# Patient Record
Sex: MALE | Race: WHITE | NOT HISPANIC OR LATINO | URBAN - METROPOLITAN AREA
[De-identification: names, ages, dates, MRNs, and addresses within clinical notes are randomized per-mention and may not be internally consistent; named-entity substitution may affect disease eponyms.]

---

## 2023-08-10 ENCOUNTER — HOSPITAL ENCOUNTER (OUTPATIENT)
Dept: DATA CONVERSION | Facility: HOSPITAL | Age: 68
Discharge: HOME | End: 2023-08-10

## 2023-08-10 DIAGNOSIS — M48.062 SPINAL STENOSIS, LUMBAR REGION WITH NEUROGENIC CLAUDICATION: ICD-10-CM

## 2023-08-10 DIAGNOSIS — M96.1 POSTLAMINECTOMY SYNDROME, NOT ELSEWHERE CLASSIFIED: ICD-10-CM

## 2023-09-22 PROBLEM — M96.1 LUMBAR POST-LAMINECTOMY SYNDROME: Status: ACTIVE | Noted: 2023-09-22

## 2023-09-22 PROBLEM — G47.33 OBSTRUCTIVE SLEEP APNEA SYNDROME: Status: ACTIVE | Noted: 2023-09-22

## 2023-09-22 PROBLEM — M51.369 LUMBAR DEGENERATIVE DISC DISEASE: Status: ACTIVE | Noted: 2023-09-22

## 2023-09-22 PROBLEM — M51.36 LUMBAR DEGENERATIVE DISC DISEASE: Status: ACTIVE | Noted: 2023-09-22

## 2023-09-22 RX ORDER — TADALAFIL 20 MG/1
0.5 TABLET ORAL AS NEEDED
COMMUNITY

## 2023-09-22 RX ORDER — OXYCODONE AND ACETAMINOPHEN 7.5; 325 MG/1; MG/1
TABLET ORAL
COMMUNITY
Start: 2023-08-28 | End: 2023-10-11 | Stop reason: ALTCHOICE

## 2023-09-22 RX ORDER — CITALOPRAM 10 MG/1
TABLET ORAL
COMMUNITY

## 2023-09-22 RX ORDER — OXYCODONE AND ACETAMINOPHEN 5; 325 MG/1; MG/1
TABLET ORAL
COMMUNITY
Start: 2022-12-12 | End: 2023-10-11 | Stop reason: ALTCHOICE

## 2023-09-22 RX ORDER — NITROGLYCERIN 0.4 MG/1
TABLET SUBLINGUAL
COMMUNITY

## 2023-09-22 RX ORDER — CETIRIZINE HYDROCHLORIDE 10 MG/1
TABLET ORAL
COMMUNITY
End: 2023-11-21

## 2023-09-22 RX ORDER — GABAPENTIN 300 MG/1
1 CAPSULE ORAL 3 TIMES DAILY
COMMUNITY
Start: 2022-07-28

## 2023-09-22 RX ORDER — TESTOSTERONE CYPIONATE 200 MG/ML
INJECTION, SOLUTION INTRAMUSCULAR
COMMUNITY

## 2023-09-22 RX ORDER — ALENDRONATE SODIUM 35 MG/1
TABLET ORAL
COMMUNITY

## 2023-10-10 ENCOUNTER — TELEPHONE (OUTPATIENT)
Dept: PAIN MEDICINE | Facility: CLINIC | Age: 68
End: 2023-10-10
Payer: MEDICARE

## 2023-10-10 DIAGNOSIS — M96.1 LUMBAR POST-LAMINECTOMY SYNDROME: Primary | ICD-10-CM

## 2023-10-10 NOTE — TELEPHONE ENCOUNTER
PT CALLED REQUESTING REFILL OF PERCOCET. LAST REFILL WAS 08/28/1923.  AWAITING SURGERY TO BE SCHEDULED

## 2023-10-10 NOTE — TELEPHONE ENCOUNTER
RECEIVED COAG CLEARANCE BACK FROM DR. CESPEDES WITH ADDITIONAL DIRECTION OF NEEDING TO CONTINUE ASA WHILE OFF BRILLIN[place]

## 2023-10-11 RX ORDER — OXYCODONE AND ACETAMINOPHEN 7.5; 325 MG/1; MG/1
1 TABLET ORAL EVERY 6 HOURS PRN
Qty: 120 TABLET | Refills: 0 | OUTPATIENT
Start: 2023-10-11 | End: 2024-02-27

## 2023-10-20 ENCOUNTER — TELEMEDICINE (OUTPATIENT)
Dept: PAIN MEDICINE | Facility: CLINIC | Age: 68
End: 2023-10-20
Payer: MEDICARE

## 2023-10-20 DIAGNOSIS — M96.1 POSTLAMINECTOMY SYNDROME, LUMBAR REGION: Primary | ICD-10-CM

## 2023-10-20 PROCEDURE — 99214 OFFICE O/P EST MOD 30 MIN: CPT | Mod: 95 | Performed by: ANESTHESIOLOGY

## 2023-10-20 PROCEDURE — 99214 OFFICE O/P EST MOD 30 MIN: CPT | Performed by: ANESTHESIOLOGY

## 2023-10-20 RX ORDER — CLOPIDOGREL BISULFATE 75 MG/1
75 TABLET ORAL DAILY
COMMUNITY

## 2023-10-20 ASSESSMENT — ENCOUNTER SYMPTOMS
ARTHRALGIAS: 1
BACK PAIN: 1
ACTIVITY CHANGE: 1
COUGH: 1

## 2023-10-20 ASSESSMENT — PAIN - FUNCTIONAL ASSESSMENT: PAIN_FUNCTIONAL_ASSESSMENT: 0-10

## 2023-10-20 ASSESSMENT — LIFESTYLE VARIABLES
HOW MANY STANDARD DRINKS CONTAINING ALCOHOL DO YOU HAVE ON A TYPICAL DAY: PATIENT DOES NOT DRINK
TOTAL SCORE: 0

## 2023-10-20 ASSESSMENT — PATIENT HEALTH QUESTIONNAIRE - PHQ9
SUM OF ALL RESPONSES TO PHQ9 QUESTIONS 1 & 2: 0
1. LITTLE INTEREST OR PLEASURE IN DOING THINGS: NOT AT ALL
2. FEELING DOWN, DEPRESSED OR HOPELESS: NOT AT ALL

## 2023-10-20 NOTE — PROGRESS NOTES
The patient is a 68-year-old male with low back and bilateral leg pain.  The pain is constant.  The pain is worse when he is standing and walking.  He gets some relief with rest.  The patient underwent spine surgery last year.  The surgery was quite successful in addressing his neurologic deficits and his instability however the patient is still experiencing significant residual pain.  He gets marginal relief with medications.  He did not benefit from physical therapy or a caudal epidural steroid injection.  The patient has questions about spinal cord stimulation.    Review of Systems   Constitutional:  Positive for activity change.   Respiratory:  Positive for cough.    Musculoskeletal:  Positive for arthralgias, back pain and gait problem.   All other systems reviewed and are negative.    GENERAL: alert and appropriate, in no distress, well-hydrated, well-nourished and interactive  SKIN: no rash noted, surgical scars well healed  RESPIRATORY: breathing non-labored and no grunting/flaring/retractions  CHEST: equal chest rise with normal respiratory effort  ABDOMEN: soft and non-tender  BACK: back normal in appearance, spine with reduced ROM  EXTREMITIES: strength intact  NEUROLOGIC: gait antalgic, SLR negative, sensation grossly intact.    Assessment and Plan    -Chronicity--chronic spinal pain    -Diagnostics--no new imaging ordered    -Pharmacologic--continue medication regimen    -Psychologic--no psychologic intervention    -Physical--we discussed the importance of physical therapy and exercise.  We discussed avoidance and modification techniques.    -Intervention--the patient is a candidate for spinal cord stimulator trial with a [employer] device.  I explained the risks benefits and alternatives of the procedure to the patient.  The patient wishes to proceed.    I spent time educating the patient on the condition including the treatment and the prognosis.  I invited the patient to call at anytime with  any questions.

## 2023-11-21 ENCOUNTER — HOSPITAL ENCOUNTER (OUTPATIENT)
Dept: OPERATING ROOM | Facility: HOSPITAL | Age: 68
Setting detail: OUTPATIENT SURGERY
Discharge: HOME | End: 2023-11-21
Payer: MEDICARE

## 2023-11-21 ENCOUNTER — HOSPITAL ENCOUNTER (OUTPATIENT)
Dept: RADIOLOGY | Facility: HOSPITAL | Age: 68
Discharge: HOME | End: 2023-11-21
Payer: MEDICARE

## 2023-11-21 VITALS
SYSTOLIC BLOOD PRESSURE: 122 MMHG | DIASTOLIC BLOOD PRESSURE: 74 MMHG | WEIGHT: 217 LBS | RESPIRATION RATE: 16 BRPM | HEART RATE: 75 BPM | TEMPERATURE: 98.4 F | HEIGHT: 68 IN | BODY MASS INDEX: 32.89 KG/M2 | OXYGEN SATURATION: 98 %

## 2023-11-21 DIAGNOSIS — M96.1 POSTLAMINECTOMY SYNDROME, LUMBAR REGION: ICD-10-CM

## 2023-11-21 DIAGNOSIS — M96.1 LUMBAR POST-LAMINECTOMY SYNDROME: Primary | ICD-10-CM

## 2023-11-21 PROCEDURE — 95972 ALYS CPLX SP/PN NPGT W/PRGRM: CPT | Mod: 59 | Performed by: ANESTHESIOLOGY

## 2023-11-21 PROCEDURE — 95972 ALYS CPLX SP/PN NPGT W/PRGRM: CPT | Performed by: ANESTHESIOLOGY

## 2023-11-21 PROCEDURE — C1778 LEAD, NEUROSTIMULATOR: HCPCS

## 2023-11-21 PROCEDURE — 63650 IMPLANT NEUROELECTRODES: CPT | Performed by: ANESTHESIOLOGY

## 2023-11-21 PROCEDURE — 77003 FLUOROGUIDE FOR SPINE INJECT: CPT

## 2023-11-21 PROCEDURE — 2780000003 HC OR 278 NO HCPCS

## 2023-11-21 PROCEDURE — 63650 IMPLANT NEUROELECTRODES: CPT | Mod: MUE | Performed by: ANESTHESIOLOGY

## 2023-11-21 PROCEDURE — 2720000007 HC OR 272 NO HCPCS

## 2023-11-21 PROCEDURE — 2500000004 HC RX 250 GENERAL PHARMACY W/ HCPCS (ALT 636 FOR OP/ED): Performed by: ANESTHESIOLOGY

## 2023-11-21 RX ORDER — CEFAZOLIN SODIUM 2 G/100ML
2 INJECTION, SOLUTION INTRAVENOUS ONCE
Status: COMPLETED | OUTPATIENT
Start: 2023-11-21 | End: 2023-11-21

## 2023-11-21 RX ORDER — OXYCODONE AND ACETAMINOPHEN 7.5; 325 MG/1; MG/1
1 TABLET ORAL EVERY 6 HOURS PRN
Qty: 120 TABLET | Refills: 0 | Status: SHIPPED | OUTPATIENT
Start: 2023-11-21 | End: 2024-01-03 | Stop reason: SDUPTHER

## 2023-11-21 RX ORDER — CEPHALEXIN 500 MG/1
500 CAPSULE ORAL 4 TIMES DAILY
Qty: 28 CAPSULE | Refills: 0 | Status: SHIPPED | OUTPATIENT
Start: 2023-11-21 | End: 2024-02-27 | Stop reason: ALTCHOICE

## 2023-11-21 RX ADMIN — CEFAZOLIN SODIUM 2 G: 2 INJECTION, SOLUTION INTRAVENOUS at 12:02

## 2023-11-21 ASSESSMENT — PAIN - FUNCTIONAL ASSESSMENT
PAIN_FUNCTIONAL_ASSESSMENT: 0-10
PAIN_FUNCTIONAL_ASSESSMENT: 0-10

## 2023-11-21 ASSESSMENT — PAIN SCALES - GENERAL
PAINLEVEL_OUTOF10: 7
PAINLEVEL_OUTOF10: 0 - NO PAIN

## 2023-11-21 ASSESSMENT — COLUMBIA-SUICIDE SEVERITY RATING SCALE - C-SSRS
6. HAVE YOU EVER DONE ANYTHING, STARTED TO DO ANYTHING, OR PREPARED TO DO ANYTHING TO END YOUR LIFE?: NO
1. IN THE PAST MONTH, HAVE YOU WISHED YOU WERE DEAD OR WISHED YOU COULD GO TO SLEEP AND NOT WAKE UP?: NO
2. HAVE YOU ACTUALLY HAD ANY THOUGHTS OF KILLING YOURSELF?: NO

## 2023-11-21 ASSESSMENT — PAIN DESCRIPTION - DESCRIPTORS: DESCRIPTORS: ACHING;DULL

## 2023-11-21 NOTE — H&P
The patient has a history of low back pain.    ROS:    Negative except pain    GENERAL: alert and appropriate, in no distress, well-hydrated, well-nourished and interactive  SKIN: no rash noted  RESPIRATORY: breathing non-labored and no grunting/flaring/retractions  CHEST: equal chest rise with normal respiratory effort  ABDOMEN: soft and non-tender  BACK: back normal in appearance, spine with reduced ROM  EXTREMITIES: strength intact  NEUROLOGIC: gait antalgic, sensation grossly intact.    Assessment and plan    Spinal cord stimulator trial

## 2023-11-21 NOTE — DISCHARGE INSTRUCTIONS
You underwent a procedure today    After most procedures, it is recommended that you relax and limit your activity for the remainder of the day unless you have been told otherwise by your pain physician.  You should not drive a car, operate machinery, or make important legal decisions unless otherwise directed by your pain physician.  You may resume your normal activity, including exercise, tomorrow.      Keep a written pain diary of how much pain relief you experienced following the procedure and the length of time of pain relief you experienced pain relief. Following diagnostic injections like medial branch nerve blocks, sacroiliac joint blocks, stellate ganglion injections and other blocks, it is very important you record the specific amount of pain relief you experienced immediately after the injectionand how long it lasted. Your doctor will ask you for this information at your follow up visit.     For all injections, please keep the injection site dry and inspect the site for a couple of days. You may remove the Band-Aid the day of the injection at any time.     Some discomfort, bruising or slight swelling may occur at the injection site. This is not abnormal if it occurs.  If needed you may:    -Take over the counter medication such as Tylenol or Motrin.   -Apply an ice pack for 30 minutes, 2 to 3 times a day for the first 24 hours.     You may shower today; no soaking baths, hot tubs, whirlpools or swimming pools for two days.      If you are given steroids in your injection, it may take 3-5 days for the steroid medication to take effect. You may notice a worsening of your symptoms for 1-2 days after the injection. This is not abnormal.  You may use acetaminophen, ibuprofen, or prescription medication that your doctor may have prescribed for you if you need to do so.     A few common side effects of steroids include facial flushing, sweating, restlessness, irritability,difficulty sleeping, increase in blood  sugar, and increased blood pressure. If you have diabetes, please monitor your blood sugar at least once a day for at least 5 days. If you have poorly controlled high blood pressure, monitoryour blood pressure for at least 2 days and contact your primary care physician if these numbers are unusually high for you.      If you take aspirin or non-steroidal anti-inflammatory drugs (examples are Motrin, Advil, ibuprofen, Naprosyn, Voltaren, Relafen, etc.) you may restart these this evening.    You do not need to discontinue non-aspirin-containing pain medications prior to an injection (examples: Celebrex, tramadol, hydrocodone and acetaminophen).      If you take a blood thinning medication (Coumadin, Lovenox, Fragmin,Ticlid, Plavix, Pradaxa, etc.), please discuss this with your primary care physician/cardiologist and your pain physician. These medications MUST be discontinued before you can have an injection safely, without the risk of uncontrolled bleeding. If these medications are not discontinued for an appropriate period of time, you will not be able to receivean injection.      If you are taking Coumadin, please have your INR checked the morning of your procedure and bringthe result to your appointment unless otherwise instructed. If your INR is over 1.2, your injection may need to be rescheduled to avoid uncontrolled bleeding from the needle placement.     Call [facility] Pain Management at [phone] between 8am-4pm Monday - Friday if you are experiencing the following:    If you received an epidural or spinal injection:    -Headache that doesnot go away with medicine, is worse when sitting or standing up, and is greatly relieved upon lying down.   -Severe pain worse than or different than your baseline pain.   -Chills or fever (101º F or greater).   -Drainage or signs of infection at the injection site     Go directly to the Emergency Department if you are experiencing the following and received an  epidural or spinal injection:   -Abrupt weakness or progressive weakness in your legs that starts after you leave the clinic.   -Abrupt severe or worsening numbness in your legs.   -Inability to urinate after the injection or loss of bowel or bladder control without the urge to defecate or urinate.     If you have a clinical question that cannot wait until your next appointment, please call [phone] between 8am-4pm Monday - Friday or send a [employer] message. We do our best to return all non-emergency messages within 24 hours, Monday - Friday. A nurse or physician will return your message.      If you need to cancel an appointment, please call the scheduling staff at [phone] during normal business hours or leave a message at least 24 hours in advance.     If you are going to be sedated for your next procedure, you MUST have responsible adult who can legally drive accompany you home. You cannot eat or drink for eight hours prior to the planned procedure if you are going to receive sedation. You may take your non-blood thinning medications with a small sip of water.

## 2023-11-21 NOTE — OP NOTE
* No procedures listed * Operative Note     Date: 2023  OR Location: [facility] GI Lab Endosc1 OR    Name: Reji Simons, : 1955, Age: 68 y.o., MRN: [id number], Sex: male    Diagnosis  * No Diagnosis Codes entered * * No Diagnosis Codes entered *     Procedures  * No procedures documented on diagnosis form *    Surgeons   * No surgeons found in log *    Resident/Fellow/Other Assistant:  * No surgeons found in log *    Procedure Summary  Anesthesia: * No anesthesia type entered *  ASA: ASA status not filed in the log.  Anesthesia Staff: No anesthesia staff entered.  Estimated Blood Loss: 0mL  Intra-op Medications: * Intraprocedure medication information is unavailable because the case start and end events have not been set *           Anesthesia Record               Intraprocedure I/O Totals       None           Specimen: No specimens collected     Staff:   No surgical staff documented.         Drains and/or Catheters: * None in log *    Tourniquet Times:         Implants:     Findings:     Indications: Reji Simons is an 68 y.o. male who is having surgery for * No pre-op diagnosis entered *.     The patient was seen in the preoperative area. The risks, benefits, complications, treatment options, non-operative alternatives, expected recovery and outcomes were discussed with the patient. The possibilities of reaction to medication, pulmonary aspiration, injury to surrounding structures, bleeding, recurrent infection, the need for additional procedures, failure to diagnose a condition, and creating a complication requiring transfusion or operation were discussed with the patient. The patient concurred with the proposed plan, giving informed consent.  The site of surgery was properly noted/marked if necessary per policy. The patient has been actively warmed in preoperative area. Preoperative antibiotics have been ordered and given within 1 hours of incision. Venous thrombosis prophylaxis are not  indicated.    Procedure Details: The patient was brought to the procedure room and placed in the prone position.  Sterile prep and drape with ChloraPrep and a fenestrated drape.  15 mL of half percent bupivacaine were injected through a 25-gauge needle for local anesthesia.  Two 14-gauge Tuohy needles were guided to the T9-T10 interlaminar space by the loss-of-resistance technique under fluoroscopic guidance.   16 contact [employer] spinal cord stimulation leads were guided to the top of the T6 vertebral body through the 14-gauge Tuohy needles.  Test stimulation was performed to ensure proper lead placement.  The leads were then secured to the skin with click anchors and sutures.  Steri-Strips were placed over the insertion site of leads.  A large Tegaderm was placed over the insertion site.  The patient was brought to the recovery area where the device was programmed.    Complications:  4 contacts of lead sheared off in subcutaneous tissue.  Disposition: PACU - hemodynamically stable.  Condition: stable         Additional Details:     Attending Attestation: I performed the procedure.    *No primary surgeon found*

## 2023-11-21 NOTE — POST-PROCEDURE NOTE
Full instruction at bedside by  Fermin cortés.  Dressing dry and intact. No neuro deficits. Ready for discharge.  Home with his sister.

## 2023-11-22 ENCOUNTER — TELEPHONE (OUTPATIENT)
Dept: PAIN MEDICINE | Facility: CLINIC | Age: 68
End: 2023-11-22
Payer: MEDICARE

## 2023-11-22 NOTE — TELEPHONE ENCOUNTER
"I CALLED CHRIS TODAY TO SEE HOW HE WAS DOING SINCE HIS SCS TRIAL IMPLANT YESTERDAY.  HE WAS VERY VAGUE ABOUT THE RELIEF OR LSCK OF RELIEF HE WAS GETTING FROM HIS SCS IMPLANT TRIAL.  THEN HE GOES ON TO TELL ME HE TURNED THE  DEVICE UP TO 20 WHILE HE WAS WALKING AROUND AT THE AIRPORT!  HE SAID HE DID THINK HE WAS \"WALKING TALLER\" TODAY.  SUGGESTED HE CALL THE REP AND CHAT WITH HIM  REGARDING HIS SETTINGS.  I ALSO SUGGESTED HE USE ICE TO HIS BACK WHEN HE IS SITTING AROUND AND HE SAID HE DOESN\" T LIKE ICE! HAS ONLY TAKEN 1 PERCOCET TODAY WHERE HE WAS TAKING THEM EVERY 6 HRS LAST WEEK.  HE ALSO SAID HE REALLY DOESN\"T LIKE THE TAPE AND THE BELT.  WHILE HE IS TELLING ME THIS HE IS DRIVING HIS TRUCK.  \"GOT THINGS TO DO\" .  PT STATES HE IS TAKING ASA WHILE OFF THE PLAVIX AND WILL RESTART THE PLAVIX ON MONDAY.  REMINDED OF HIS FOLLOW UP APPOINTMENT ON MONDAY IN [place].  "

## 2023-11-27 ENCOUNTER — OFFICE VISIT (OUTPATIENT)
Dept: PAIN MEDICINE | Facility: CLINIC | Age: 68
End: 2023-11-27
Payer: MEDICARE

## 2023-11-27 VITALS
SYSTOLIC BLOOD PRESSURE: 118 MMHG | DIASTOLIC BLOOD PRESSURE: 70 MMHG | HEIGHT: 68 IN | HEART RATE: 71 BPM | BODY MASS INDEX: 31.83 KG/M2 | WEIGHT: 210 LBS | RESPIRATION RATE: 22 BRPM

## 2023-11-27 DIAGNOSIS — M96.1 POSTLAMINECTOMY SYNDROME, LUMBAR REGION: Primary | ICD-10-CM

## 2023-11-27 PROCEDURE — 99024 POSTOP FOLLOW-UP VISIT: CPT | Performed by: ANESTHESIOLOGY

## 2023-11-27 PROCEDURE — 1125F AMNT PAIN NOTED PAIN PRSNT: CPT | Performed by: ANESTHESIOLOGY

## 2023-11-27 PROCEDURE — 1159F MED LIST DOCD IN RCRD: CPT | Performed by: ANESTHESIOLOGY

## 2023-11-27 ASSESSMENT — PATIENT HEALTH QUESTIONNAIRE - PHQ9
1. LITTLE INTEREST OR PLEASURE IN DOING THINGS: NOT AT ALL
SUM OF ALL RESPONSES TO PHQ9 QUESTIONS 1 & 2: 0
2. FEELING DOWN, DEPRESSED OR HOPELESS: NOT AT ALL

## 2023-11-27 ASSESSMENT — PAIN SCALES - GENERAL: PAINLEVEL: 6

## 2023-11-27 ASSESSMENT — LIFESTYLE VARIABLES
HOW OFTEN DO YOU HAVE SIX OR MORE DRINKS ON ONE OCCASION: NEVER
HOW OFTEN DO YOU HAVE A DRINK CONTAINING ALCOHOL: MONTHLY OR LESS
AUDIT-C TOTAL SCORE: 1
HOW MANY STANDARD DRINKS CONTAINING ALCOHOL DO YOU HAVE ON A TYPICAL DAY: 1 OR 2
SKIP TO QUESTIONS 9-10: 1

## 2023-11-27 NOTE — PROGRESS NOTES
The patient is a 68-year-old male with low back and leg pain.  The pain is constant.  The pain is worse with activity.  He gets some relief with rest.  The patient underwent a spinal cord stimulator trial last week.  The patient had an excellent response.  Patient reports at least 75% relief of his pain and 75% improvement in his ability to function.  The patient is eager to proceed with the implant.    GENERAL: alert and appropriate, in no distress, well-hydrated, well-nourished and interactive  SKIN: no rash noted, surgical scars well healed, lead removed without incident, no erythema edema or drainage  RESPIRATORY: breathing non-labored and no grunting/flaring/retractions  CHEST: equal chest rise with normal respiratory effort  ABDOMEN: soft and non-tender  BACK: back normal in appearance, spine with reduced ROM  EXTREMITIES: strength intact  NEUROLOGIC: gait antalgic, SLR negative, sensation grossly intact.    Assessment and plan    -The patient is a candidate for a spinal cord stimulator implantation surgery with a [employer] device.  -I explained the risks benefits and alternatives of the procedure to the patient.  The patient wishes to proceed.    I spent time educating the patient on the condition including the treatment and the prognosis.  I invited the patient to call at anytime with any questions.

## 2023-12-01 ENCOUNTER — PREP FOR PROCEDURE (OUTPATIENT)
Dept: OPERATING ROOM | Facility: HOSPITAL | Age: 68
End: 2023-12-01
Payer: MEDICARE

## 2023-12-01 DIAGNOSIS — M96.1 POSTLAMINECTOMY SYNDROME, LUMBAR REGION: Primary | ICD-10-CM

## 2023-12-01 RX ORDER — SODIUM CHLORIDE, SODIUM LACTATE, POTASSIUM CHLORIDE, CALCIUM CHLORIDE 600; 310; 30; 20 MG/100ML; MG/100ML; MG/100ML; MG/100ML
100 INJECTION, SOLUTION INTRAVENOUS CONTINUOUS
Status: CANCELLED | OUTPATIENT
Start: 2023-12-01

## 2024-01-03 ENCOUNTER — TELEPHONE (OUTPATIENT)
Dept: PAIN MEDICINE | Facility: CLINIC | Age: 69
End: 2024-01-03
Payer: MEDICARE

## 2024-01-03 DIAGNOSIS — M96.1 LUMBAR POST-LAMINECTOMY SYNDROME: ICD-10-CM

## 2024-01-03 RX ORDER — OXYCODONE AND ACETAMINOPHEN 7.5; 325 MG/1; MG/1
1 TABLET ORAL EVERY 6 HOURS PRN
Qty: 120 TABLET | Refills: 0 | Status: SHIPPED | OUTPATIENT
Start: 2024-01-03 | End: 2024-03-22 | Stop reason: SDUPTHER

## 2024-01-03 NOTE — TELEPHONE ENCOUNTER
Reji called requesting refill of his oxycodone 7.5 mgm while he awaits approval for his scs implant.

## 2024-02-01 NOTE — TELEPHONE ENCOUNTER
Called to schedule his SCS implant and he said he has to wait because he needs to have 2 MRI 's and 2 CT scans before this implant . He will call me back when ready .

## 2024-02-15 DIAGNOSIS — M96.1 POSTLAMINECTOMY SYNDROME, LUMBAR REGION: Primary | ICD-10-CM

## 2024-02-15 RX ORDER — OXYCODONE AND ACETAMINOPHEN 7.5; 325 MG/1; MG/1
1 TABLET ORAL EVERY 6 HOURS PRN
Qty: 120 TABLET | Refills: 0 | OUTPATIENT
Start: 2024-02-15 | End: 2024-02-27

## 2024-02-27 ENCOUNTER — HOSPITAL ENCOUNTER (OUTPATIENT)
Dept: GASTROENTEROLOGY | Facility: HOSPITAL | Age: 69
Discharge: HOME | End: 2024-02-27
Payer: MEDICARE

## 2024-02-27 ENCOUNTER — HOSPITAL ENCOUNTER (OUTPATIENT)
Dept: RADIOLOGY | Facility: HOSPITAL | Age: 69
Discharge: HOME | End: 2024-02-27
Payer: MEDICARE

## 2024-02-27 VITALS
HEART RATE: 77 BPM | SYSTOLIC BLOOD PRESSURE: 111 MMHG | DIASTOLIC BLOOD PRESSURE: 79 MMHG | BODY MASS INDEX: 31.98 KG/M2 | OXYGEN SATURATION: 97 % | HEIGHT: 68 IN | WEIGHT: 211 LBS | RESPIRATION RATE: 14 BRPM | TEMPERATURE: 98.1 F

## 2024-02-27 DIAGNOSIS — M96.1 LUMBAR POST-LAMINECTOMY SYNDROME: ICD-10-CM

## 2024-02-27 PROCEDURE — 2550000001 HC RX 255 CONTRASTS: Performed by: ANESTHESIOLOGY

## 2024-02-27 PROCEDURE — 62323 NJX INTERLAMINAR LMBR/SAC: CPT | Performed by: ANESTHESIOLOGY

## 2024-02-27 PROCEDURE — 2500000005 HC RX 250 GENERAL PHARMACY W/O HCPCS: Performed by: ANESTHESIOLOGY

## 2024-02-27 PROCEDURE — 2500000004 HC RX 250 GENERAL PHARMACY W/ HCPCS (ALT 636 FOR OP/ED): Performed by: ANESTHESIOLOGY

## 2024-02-27 RX ORDER — TRIAMCINOLONE ACETONIDE 40 MG/ML
INJECTION, SUSPENSION INTRA-ARTICULAR; INTRAMUSCULAR AS NEEDED
Status: COMPLETED | OUTPATIENT
Start: 2024-02-27 | End: 2024-02-27

## 2024-02-27 RX ORDER — CARVEDILOL 3.12 MG/1
3.12 TABLET ORAL
COMMUNITY

## 2024-02-27 RX ORDER — LIDOCAINE HYDROCHLORIDE 5 MG/ML
INJECTION, SOLUTION INFILTRATION; PERINEURAL AS NEEDED
Status: COMPLETED | OUTPATIENT
Start: 2024-02-27 | End: 2024-02-27

## 2024-02-27 RX ORDER — PREDNISONE 5 MG/1
5 TABLET ORAL DAILY
COMMUNITY
Start: 2023-07-05

## 2024-02-27 RX ORDER — PANTOPRAZOLE SODIUM 40 MG/1
40 TABLET, DELAYED RELEASE ORAL 2 TIMES DAILY
COMMUNITY

## 2024-02-27 RX ADMIN — LIDOCAINE HYDROCHLORIDE 4 ML: 5 INJECTION, SOLUTION INFILTRATION; PERINEURAL at 14:23

## 2024-02-27 RX ADMIN — TRIAMCINOLONE ACETONIDE 60 MG: 40 INJECTION, SUSPENSION INTRA-ARTICULAR; INTRAMUSCULAR at 14:24

## 2024-02-27 RX ADMIN — IOHEXOL 1 ML: 240 INJECTION, SOLUTION INTRATHECAL; INTRAVASCULAR; INTRAVENOUS; ORAL at 14:27

## 2024-02-27 ASSESSMENT — PAIN SCALES - GENERAL
PAINLEVEL_OUTOF10: 4
PAINLEVEL_OUTOF10: 8

## 2024-02-27 ASSESSMENT — PAIN DESCRIPTION - DESCRIPTORS: DESCRIPTORS: ACHING;SHARP

## 2024-02-27 ASSESSMENT — COLUMBIA-SUICIDE SEVERITY RATING SCALE - C-SSRS
1. IN THE PAST MONTH, HAVE YOU WISHED YOU WERE DEAD OR WISHED YOU COULD GO TO SLEEP AND NOT WAKE UP?: NO
2. HAVE YOU ACTUALLY HAD ANY THOUGHTS OF KILLING YOURSELF?: NO
6. HAVE YOU EVER DONE ANYTHING, STARTED TO DO ANYTHING, OR PREPARED TO DO ANYTHING TO END YOUR LIFE?: NO

## 2024-02-27 ASSESSMENT — PAIN - FUNCTIONAL ASSESSMENT
PAIN_FUNCTIONAL_ASSESSMENT: 0-10
PAIN_FUNCTIONAL_ASSESSMENT: 0-10

## 2024-02-27 NOTE — POST-PROCEDURE NOTE
1426- pt brought back from procedure. Pt is alert and awake. Bandaid clean dry and intact.denies weakness, n/t. Discharge instructions given and explained to pt.

## 2024-02-27 NOTE — DISCHARGE INSTRUCTIONS
You underwent a procedure today    After most procedures, it is recommended that you relax and limit your activity for the remainder of the day unless you have been told otherwise by your pain physician.  You should not drive a car, operate machinery, or make important legal decisions unless otherwise directed by your pain physician.  You may resume your normal activity, including exercise, tomorrow.      Keep a written pain diary of how much pain relief you experienced following the procedure and the length of time of pain relief you experienced pain relief. Following diagnostic injections like medial branch nerve blocks, sacroiliac joint blocks, stellate ganglion injections and other blocks, it is very important you record the specific amount of pain relief you experienced immediately after the injectionand how long it lasted. Your doctor will ask you for this information at your follow up visit.     For all injections, please keep the injection site dry and inspect the site for a couple of days. You may remove the Band-Aid the day of the injection at any time.     Some discomfort, bruising or slight swelling may occur at the injection site. This is not abnormal if it occurs.  If needed you may:    -Take over the counter medication such as Tylenol or Motrin.   -Apply an ice pack for 30 minutes, 2 to 3 times a day for the first 24 hours.     You may shower today; no soaking baths, hot tubs, whirlpools or swimming pools for two days.      If you are given steroids in your injection, it may take 3-5 days for the steroid medication to take effect. You may notice a worsening of your symptoms for 1-2 days after the injection. This is not abnormal.  You may use acetaminophen, ibuprofen, or prescription medication that your doctor may have prescribed for you if you need to do so.     A few common side effects of steroids include facial flushing, sweating, restlessness, irritability,difficulty sleeping, increase in blood  sugar, and increased blood pressure. If you have diabetes, please monitor your blood sugar at least once a day for at least 5 days. If you have poorly controlled high blood pressure, monitoryour blood pressure for at least 2 days and contact your primary care physician if these numbers are unusually high for you.      If you take aspirin or non-steroidal anti-inflammatory drugs (examples are Motrin, Advil, ibuprofen, Naprosyn, Voltaren, Relafen, etc.) you may restart these this evening.    You do not need to discontinue non-aspirin-containing pain medications prior to an injection (examples: Celebrex, tramadol, hydrocodone and acetaminophen).      If you take a blood thinning medication (Coumadin, Lovenox, Fragmin,Ticlid, Plavix, Pradaxa, etc.), please discuss this with your primary care physician/cardiologist and your pain physician. These medications MUST be discontinued before you can have an injection safely, without the risk of uncontrolled bleeding. If these medications are not discontinued for an appropriate period of time, you will not be able to receivean injection.      If you are taking Coumadin, please have your INR checked the morning of your procedure and bringthe result to your appointment unless otherwise instructed. If your INR is over 1.2, your injection may need to be rescheduled to avoid uncontrolled bleeding from the needle placement.     Call [facility] Pain Management at [phone] between 8am-4pm Monday - Friday if you are experiencing the following:    If you received an epidural or spinal injection:    -Headache that doesnot go away with medicine, is worse when sitting or standing up, and is greatly relieved upon lying down.   -Severe pain worse than or different than your baseline pain.   -Chills or fever (101º F or greater).   -Drainage or signs of infection at the injection site     Go directly to the Emergency Department if you are experiencing the following and received an  epidural or spinal injection:   -Abrupt weakness or progressive weakness in your legs that starts after you leave the clinic.   -Abrupt severe or worsening numbness in your legs.   -Inability to urinate after the injection or loss of bowel or bladder control without the urge to defecate or urinate.     If you have a clinical question that cannot wait until your next appointment, please call [phone] between 8am-4pm Monday - Friday or send a [employer] message. We do our best to return all non-emergency messages within 24 hours, Monday - Friday. A nurse or physician will return your message.      If you need to cancel an appointment, please call the scheduling staff at [phone] during normal business hours or leave a message at least 24 hours in advance.     If you are going to be sedated for your next procedure, you MUST have responsible adult who can legally drive accompany you home. You cannot eat or drink for eight hours prior to the planned procedure if you are going to receive sedation. You may take your non-blood thinning medications with a small sip of water.

## 2024-02-27 NOTE — OP NOTE
* No procedures listed * Operative Note     Date: 2024  OR Location: [facility] GI Lab Endosc1    Name: Reji Simons, : 1955, Age: 68 y.o., MRN: [id number], Sex: male    Diagnosis  * No Diagnosis Codes entered * * No Diagnosis Codes entered *     Procedures  * No procedures documented on diagnosis form *    Surgeons   * No surgeons found in log *    Resident/Fellow/Other Assistant:  * No surgeons found in log *    Procedure Summary  Anesthesia: * No anesthesia type entered *  ASA: ASA status not filed in the log.  Anesthesia Staff: No anesthesia staff entered.  Estimated Blood Loss: 0mL  Intra-op Medications: * Intraprocedure medication information is unavailable because the case start and end events have not been set *      Intraprocedure I/O Totals       None           Specimen: No specimens collected     Staff:   No surgical staff documented.         Drains and/or Catheters: * None in log *    Tourniquet Times:         Implants:     Findings:     Indications: Reji Simons is an 68 y.o. male who is having surgery for * No pre-op diagnosis entered *.     The patient was seen in the preoperative area. The risks, benefits, complications, treatment options, non-operative alternatives, expected recovery and outcomes were discussed with the patient. The possibilities of reaction to medication, pulmonary aspiration, injury to surrounding structures, bleeding, recurrent infection, the need for additional procedures, failure to diagnose a condition, and creating a complication requiring transfusion or operation were discussed with the patient. The patient concurred with the proposed plan, giving informed consent.  The site of surgery was properly noted/marked if necessary per policy. The patient has been actively warmed in preoperative area. Preoperative antibiotics are not indicated. Venous thrombosis prophylaxis are not indicated.    Procedure Details: The patient was brought to the procedure room and placed in the  prone position.  Sterile prep and drape with ChloraPrep and sterile towels.  6 mL of half percent lidocaine were injected through a 25-gauge needle for local anesthesia.  A 22-gauge spinal needle was guided through the sacral hiatus to the caudal epidural space.  After negative aspiration, 1 mL of contrast was injected through the needle to ensure proper needle placement.  Then 8 mL of 0.25% percent lidocaine and 60 mg of triamcinolone were injected through the needle.  The needle was removed and the patient was transferred to recovery.   Complications:  None; patient tolerated the procedure well.    Disposition: PACU - hemodynamically stable.  Condition: stable         Additional Details:     Attending Attestation: I performed the procedure.    *No primary surgeon found*

## 2024-03-22 ENCOUNTER — PATIENT MESSAGE (OUTPATIENT)
Dept: PAIN MEDICINE | Facility: CLINIC | Age: 69
End: 2024-03-22
Payer: MEDICARE

## 2024-03-22 DIAGNOSIS — M96.1 LUMBAR POST-LAMINECTOMY SYNDROME: ICD-10-CM

## 2024-03-22 DIAGNOSIS — M25.551 CHRONIC RIGHT HIP PAIN: Primary | ICD-10-CM

## 2024-03-22 DIAGNOSIS — G89.29 CHRONIC RIGHT HIP PAIN: Primary | ICD-10-CM

## 2024-03-22 RX ORDER — OXYCODONE AND ACETAMINOPHEN 7.5; 325 MG/1; MG/1
1 TABLET ORAL EVERY 6 HOURS PRN
Qty: 120 TABLET | Refills: 0 | Status: SHIPPED | OUTPATIENT
Start: 2024-03-22 | End: 2024-05-02 | Stop reason: SDUPTHER

## 2024-03-25 ENCOUNTER — HOSPITAL ENCOUNTER (OUTPATIENT)
Dept: RADIOLOGY | Facility: CLINIC | Age: 69
Discharge: HOME | End: 2024-03-25
Payer: MEDICARE

## 2024-03-25 DIAGNOSIS — G89.29 CHRONIC RIGHT HIP PAIN: ICD-10-CM

## 2024-03-25 DIAGNOSIS — M25.551 CHRONIC RIGHT HIP PAIN: ICD-10-CM

## 2024-03-25 PROCEDURE — 73502 X-RAY EXAM HIP UNI 2-3 VIEWS: CPT | Mod: RIGHT SIDE | Performed by: STUDENT IN AN ORGANIZED HEALTH CARE EDUCATION/TRAINING PROGRAM

## 2024-03-25 PROCEDURE — 73502 X-RAY EXAM HIP UNI 2-3 VIEWS: CPT | Mod: RT

## 2024-03-28 ENCOUNTER — PATIENT MESSAGE (OUTPATIENT)
Dept: PAIN MEDICINE | Facility: CLINIC | Age: 69
End: 2024-03-28
Payer: MEDICARE

## 2024-03-28 DIAGNOSIS — M25.551 RIGHT HIP PAIN: ICD-10-CM

## 2024-04-09 ENCOUNTER — HOSPITAL ENCOUNTER (OUTPATIENT)
Dept: GASTROENTEROLOGY | Facility: HOSPITAL | Age: 69
Discharge: HOME | End: 2024-04-09
Payer: MEDICARE

## 2024-04-09 ENCOUNTER — HOSPITAL ENCOUNTER (OUTPATIENT)
Dept: RADIOLOGY | Facility: HOSPITAL | Age: 69
Discharge: HOME | End: 2024-04-09
Payer: MEDICARE

## 2024-04-09 VITALS
OXYGEN SATURATION: 98 % | SYSTOLIC BLOOD PRESSURE: 113 MMHG | BODY MASS INDEX: 32.28 KG/M2 | DIASTOLIC BLOOD PRESSURE: 89 MMHG | TEMPERATURE: 97.5 F | RESPIRATION RATE: 17 BRPM | WEIGHT: 213 LBS | HEIGHT: 68 IN | HEART RATE: 70 BPM

## 2024-04-09 DIAGNOSIS — M25.551 RIGHT HIP PAIN: ICD-10-CM

## 2024-04-09 PROCEDURE — 2500000004 HC RX 250 GENERAL PHARMACY W/ HCPCS (ALT 636 FOR OP/ED): Performed by: ANESTHESIOLOGY

## 2024-04-09 PROCEDURE — 2500000005 HC RX 250 GENERAL PHARMACY W/O HCPCS: Performed by: ANESTHESIOLOGY

## 2024-04-09 PROCEDURE — 2550000001 HC RX 255 CONTRASTS: Performed by: ANESTHESIOLOGY

## 2024-04-09 PROCEDURE — 20610 DRAIN/INJ JOINT/BURSA W/O US: CPT | Performed by: ANESTHESIOLOGY

## 2024-04-09 RX ORDER — LIDOCAINE HYDROCHLORIDE 5 MG/ML
INJECTION, SOLUTION INFILTRATION; PERINEURAL AS NEEDED
Status: COMPLETED | OUTPATIENT
Start: 2024-04-09 | End: 2024-04-09

## 2024-04-09 RX ORDER — PRIMIDONE 50 MG/1
50 TABLET ORAL 4 TIMES DAILY
COMMUNITY
Start: 2024-03-04

## 2024-04-09 RX ORDER — TRIAMCINOLONE ACETONIDE 40 MG/ML
INJECTION, SUSPENSION INTRA-ARTICULAR; INTRAMUSCULAR AS NEEDED
Status: COMPLETED | OUTPATIENT
Start: 2024-04-09 | End: 2024-04-09

## 2024-04-09 RX ADMIN — LIDOCAINE HYDROCHLORIDE 10 ML: 5 INJECTION, SOLUTION INFILTRATION; PERINEURAL at 10:28

## 2024-04-09 RX ADMIN — TRIAMCINOLONE ACETONIDE 40 MG: 40 INJECTION, SUSPENSION INTRA-ARTICULAR; INTRAMUSCULAR at 10:29

## 2024-04-09 RX ADMIN — IOHEXOL 1 ML: 240 INJECTION, SOLUTION INTRATHECAL; INTRAVASCULAR; INTRAVENOUS; ORAL at 10:29

## 2024-04-09 ASSESSMENT — PAIN SCALES - GENERAL
PAINLEVEL_OUTOF10: 2
PAINLEVEL_OUTOF10: 7
PAINLEVEL_OUTOF10: 7

## 2024-04-09 ASSESSMENT — PAIN - FUNCTIONAL ASSESSMENT
PAIN_FUNCTIONAL_ASSESSMENT: 0-10
PAIN_FUNCTIONAL_ASSESSMENT: 0-10

## 2024-04-09 ASSESSMENT — PAIN DESCRIPTION - DESCRIPTORS
DESCRIPTORS: ACHING
DESCRIPTORS: SHARP

## 2024-04-09 NOTE — DISCHARGE INSTRUCTIONS
You underwent a procedure today    After most procedures, it is recommended that you relax and limit your activity for the remainder of the day unless you have been told otherwise by your pain physician.  You should not drive a car, operate machinery, or make important legal decisions unless otherwise directed by your pain physician.  You may resume your normal activity, including exercise, tomorrow.      Keep a written pain diary of how much pain relief you experienced following the procedure and the length of time of pain relief you experienced pain relief. Following diagnostic injections like medial branch nerve blocks, sacroiliac joint blocks, stellate ganglion injections and other blocks, it is very important you record the specific amount of pain relief you experienced immediately after the injectionand how long it lasted. Your doctor will ask you for this information at your follow up visit.     For all injections, please keep the injection site dry and inspect the site for a couple of days. You may remove the Band-Aid the day of the injection at any time.     Some discomfort, bruising or slight swelling may occur at the injection site. This is not abnormal if it occurs.  If needed you may:    -Take over the counter medication such as Tylenol or Motrin.   -Apply an ice pack for 30 minutes, 2 to 3 times a day for the first 24 hours.     You may shower today; no soaking baths, hot tubs, whirlpools or swimming pools for two days.      If you are given steroids in your injection, it may take 3-5 days for the steroid medication to take effect. You may notice a worsening of your symptoms for 1-2 days after the injection. This is not abnormal.  You may use acetaminophen, ibuprofen, or prescription medication that your doctor may have prescribed for you if you need to do so.     A few common side effects of steroids include facial flushing, sweating, restlessness, irritability,difficulty sleeping, increase in blood  sugar, and increased blood pressure. If you have diabetes, please monitor your blood sugar at least once a day for at least 5 days. If you have poorly controlled high blood pressure, monitoryour blood pressure for at least 2 days and contact your primary care physician if these numbers are unusually high for you.      If you take aspirin or non-steroidal anti-inflammatory drugs (examples are Motrin, Advil, ibuprofen, Naprosyn, Voltaren, Relafen, etc.) you may restart these this evening.    You do not need to discontinue non-aspirin-containing pain medications prior to an injection (examples: Celebrex, tramadol, hydrocodone and acetaminophen).      If you take a blood thinning medication (Coumadin, Lovenox, Fragmin,Ticlid, Plavix, Pradaxa, etc.), please discuss this with your primary care physician/cardiologist and your pain physician. These medications MUST be discontinued before you can have an injection safely, without the risk of uncontrolled bleeding. If these medications are not discontinued for an appropriate period of time, you will not be able to receivean injection.      If you are taking Coumadin, please have your INR checked the morning of your procedure and bringthe result to your appointment unless otherwise instructed. If your INR is over 1.2, your injection may need to be rescheduled to avoid uncontrolled bleeding from the needle placement.     Call [facility] at [phone] between 8am-4pm Monday - Friday if you are experiencing the following:    If you received an epidural or spinal injection:    -Headache that doesnot go away with medicine, is worse when sitting or standing up, and is greatly relieved upon lying down.   -Severe pain worse than or different than your baseline pain.   -Chills or fever (101º F or greater).   -Drainage or signs of infection at the injection site     Go directly to the Emergency Department if you are experiencing the following and received an  epidural or spinal injection:   -Abrupt weakness or progressive weakness in your legs that starts after you leave the clinic.   -Abrupt severe or worsening numbness in your legs.   -Inability to urinate after the injection or loss of bowel or bladder control without the urge to defecate or urinate.     If you have a clinical question that cannot wait until your next appointment, please call [phone] between 8am-4pm Monday - Friday or send a [employer] message. We do our best to return all non-emergency messages within 24 hours, Monday - Friday. A nurse or physician will return your message.      If you need to cancel an appointment, please call the scheduling staff at [phone] during normal business hours or leave a message at least 24 hours in advance.     If you are going to be sedated for your next procedure, you MUST have responsible adult who can legally drive accompany you home. You cannot eat or drink for eight hours prior to the planned procedure if you are going to receive sedation. You may take your non-blood thinning medications with a small sip of water.

## 2024-04-09 NOTE — OP NOTE
* No procedures listed * Operative Note     Date: 2024  OR Location: [facility] Endosc1    Name: Reji Simons, : 1955, Age: 69 y.o., MRN: [id number], Sex: male    Diagnosis  * No Diagnosis Codes entered * * No Diagnosis Codes entered *     Procedures  * No procedures documented on diagnosis form *    Surgeons   * No surgeons found in log *    Resident/Fellow/Other Assistant:  * No surgeons found in log *    Procedure Summary  Anesthesia: * No anesthesia type entered *  ASA: ASA status not filed in the log.  Anesthesia Staff: No anesthesia staff entered.  Estimated Blood Loss: 0mL  Intra-op Medications: * Intraprocedure medication information is unavailable because the case start and end events have not been set *      Intraprocedure I/O Totals       None           Specimen: No specimens collected     Staff:   Circulator: Natasha Joseph RN         Drains and/or Catheters: * None in log *    Tourniquet Times:         Implants:     Findings:     Indications: Reji Simons is an 69 y.o. male who is having surgery for * No pre-op diagnosis entered *.     The patient was seen in the preoperative area. The risks, benefits, complications, treatment options, non-operative alternatives, expected recovery and outcomes were discussed with the patient. The possibilities of reaction to medication, pulmonary aspiration, injury to surrounding structures, bleeding, recurrent infection, the need for additional procedures, failure to diagnose a condition, and creating a complication requiring transfusion or operation were discussed with the patient. The patient concurred with the proposed plan, giving informed consent.  The site of surgery was properly noted/marked if necessary per policy. The patient has been actively warmed in preoperative area. Preoperative antibiotics are not indicated. Venous thrombosis prophylaxis are not indicated.    Procedure Details: The patient was brought to the procedure room and placed in the  left lateral decubitus position.  Sterile prep and drape with ChloraPrep and a fenestrated drape.  5 mL of half percent lidocaine were injected through a 25-gauge spinal needle for local anesthesia.  A 22-gauge spinal needle was guided to the intra-articular aspect of the right hip.  Contrast was injected under live fluoroscopy to ensure proper needle placement.  Then 40 mg of methylprednisolone and 5 mL of half percent lidocaine were injected through the needle.  The needle was removed and the patient was transferred to recovery.   Complications:  None; patient tolerated the procedure well.    Disposition: PACU - hemodynamically stable.  Condition: stable         Additional Details:     Attending Attestation: I performed the procedure.    *No primary surgeon found*

## 2024-04-09 NOTE — H&P (VIEW-ONLY)
The patient has a history of right hip pain.    ROS:    Negative except pain    GENERAL: alert and appropriate, in no distress, well-hydrated, well-nourished and interactive  SKIN: no rash noted  RESPIRATORY: breathing non-labored and no grunting/flaring/retractions  CHEST: equal chest rise with normal respiratory effort  ABDOMEN: soft and non-tender  BACK: back normal in appearance, spine with reduced ROM  EXTREMITIES: strength intact  NEUROLOGIC: gait antalgic, sensation grossly intact.    Assessment and plan    Right hip injection

## 2024-04-22 ENCOUNTER — PATIENT MESSAGE (OUTPATIENT)
Dept: PAIN MEDICINE | Facility: CLINIC | Age: 69
End: 2024-04-22
Payer: MEDICARE

## 2024-04-26 ENCOUNTER — TELEPHONE (OUTPATIENT)
Dept: PAIN MEDICINE | Facility: CLINIC | Age: 69
End: 2024-04-26
Payer: MEDICARE

## 2024-04-29 DIAGNOSIS — M54.14 THORACIC RADICULOPATHY: ICD-10-CM

## 2024-05-02 DIAGNOSIS — M96.1 LUMBAR POST-LAMINECTOMY SYNDROME: ICD-10-CM

## 2024-05-03 RX ORDER — OXYCODONE AND ACETAMINOPHEN 7.5; 325 MG/1; MG/1
1 TABLET ORAL EVERY 6 HOURS PRN
Qty: 120 TABLET | Refills: 0 | Status: SHIPPED | OUTPATIENT
Start: 2024-05-03 | End: 2024-06-05 | Stop reason: SDUPTHER

## 2024-05-07 ENCOUNTER — HOSPITAL ENCOUNTER (OUTPATIENT)
Dept: RADIOLOGY | Facility: HOSPITAL | Age: 69
Discharge: HOME | End: 2024-05-07
Payer: MEDICARE

## 2024-05-07 ENCOUNTER — HOSPITAL ENCOUNTER (OUTPATIENT)
Dept: OPERATING ROOM | Facility: HOSPITAL | Age: 69
Discharge: HOME | End: 2024-05-07
Payer: MEDICARE

## 2024-05-07 VITALS
HEIGHT: 68 IN | DIASTOLIC BLOOD PRESSURE: 69 MMHG | RESPIRATION RATE: 16 BRPM | HEART RATE: 76 BPM | SYSTOLIC BLOOD PRESSURE: 109 MMHG | BODY MASS INDEX: 30.77 KG/M2 | WEIGHT: 203 LBS | TEMPERATURE: 97.2 F | OXYGEN SATURATION: 97 %

## 2024-05-07 DIAGNOSIS — M54.14 THORACIC RADICULOPATHY: ICD-10-CM

## 2024-05-07 PROCEDURE — 62321 NJX INTERLAMINAR CRV/THRC: CPT | Performed by: ANESTHESIOLOGY

## 2024-05-07 PROCEDURE — 2550000001 HC RX 255 CONTRASTS: Performed by: ANESTHESIOLOGY

## 2024-05-07 PROCEDURE — 2500000005 HC RX 250 GENERAL PHARMACY W/O HCPCS: Performed by: ANESTHESIOLOGY

## 2024-05-07 PROCEDURE — 2500000004 HC RX 250 GENERAL PHARMACY W/ HCPCS (ALT 636 FOR OP/ED): Performed by: ANESTHESIOLOGY

## 2024-05-07 RX ORDER — LIDOCAINE HYDROCHLORIDE 5 MG/ML
INJECTION, SOLUTION INFILTRATION; PERINEURAL AS NEEDED
Status: COMPLETED | OUTPATIENT
Start: 2024-05-07 | End: 2024-05-07

## 2024-05-07 RX ORDER — TRIAMCINOLONE ACETONIDE 40 MG/ML
INJECTION, SUSPENSION INTRA-ARTICULAR; INTRAMUSCULAR AS NEEDED
Status: COMPLETED | OUTPATIENT
Start: 2024-05-07 | End: 2024-05-07

## 2024-05-07 RX ADMIN — TRIAMCINOLONE ACETONIDE 60 MG: 40 INJECTION, SUSPENSION INTRA-ARTICULAR; INTRAMUSCULAR at 12:14

## 2024-05-07 RX ADMIN — IOHEXOL 1 ML: 240 INJECTION, SOLUTION INTRATHECAL; INTRAVASCULAR; INTRAVENOUS; ORAL at 12:14

## 2024-05-07 RX ADMIN — LIDOCAINE HYDROCHLORIDE 10 ML: 5 INJECTION, SOLUTION INFILTRATION; PERINEURAL at 12:14

## 2024-05-07 ASSESSMENT — PAIN SCALES - GENERAL
PAINLEVEL_OUTOF10: 1
PAINLEVEL_OUTOF10: 4
PAINLEVEL_OUTOF10: 4

## 2024-05-07 ASSESSMENT — PAIN DESCRIPTION - DESCRIPTORS
DESCRIPTORS: SORE
DESCRIPTORS: SHARP

## 2024-05-07 ASSESSMENT — COLUMBIA-SUICIDE SEVERITY RATING SCALE - C-SSRS
6. HAVE YOU EVER DONE ANYTHING, STARTED TO DO ANYTHING, OR PREPARED TO DO ANYTHING TO END YOUR LIFE?: NO
2. HAVE YOU ACTUALLY HAD ANY THOUGHTS OF KILLING YOURSELF?: NO
1. IN THE PAST MONTH, HAVE YOU WISHED YOU WERE DEAD OR WISHED YOU COULD GO TO SLEEP AND NOT WAKE UP?: NO

## 2024-05-07 ASSESSMENT — PAIN - FUNCTIONAL ASSESSMENT
PAIN_FUNCTIONAL_ASSESSMENT: 0-10
PAIN_FUNCTIONAL_ASSESSMENT: 0-10

## 2024-05-07 NOTE — POST-PROCEDURE NOTE
Patent returned from pain procedure A&O x 3. Denies pain at this time. States its not even a 1/10. Feels more of a sting from injection. VSS. Able to move and ambulate without difficulty. Discharge instructions provided to and reviewed with patient and he has verbalized understanding. Band-aid in place,clean, dry and intact.

## 2024-05-07 NOTE — DISCHARGE INSTRUCTIONS
You underwent a procedure today    After most procedures, it is recommended that you relax and limit your activity for the remainder of the day unless you have been told otherwise by your pain physician.  You should not drive a car, operate machinery, or make important legal decisions unless otherwise directed by your pain physician.  You may resume your normal activity, including exercise, tomorrow.      Keep a written pain diary of how much pain relief you experienced following the procedure and the length of time of pain relief you experienced pain relief. Following diagnostic injections like medial branch nerve blocks, sacroiliac joint blocks, stellate ganglion injections and other blocks, it is very important you record the specific amount of pain relief you experienced immediately after the injectionand how long it lasted. Your doctor will ask you for this information at your follow up visit.     For all injections, please keep the injection site dry and inspect the site for a couple of days. You may remove the Band-Aid the day of the injection at any time.     Some discomfort, bruising or slight swelling may occur at the injection site. This is not abnormal if it occurs.  If needed you may:    -Take over the counter medication such as Tylenol or Motrin.   -Apply an ice pack for 30 minutes, 2 to 3 times a day for the first 24 hours.     You may shower today; no soaking baths, hot tubs, whirlpools or swimming pools for two days.      If you are given steroids in your injection, it may take 3-5 days for the steroid medication to take effect. You may notice a worsening of your symptoms for 1-2 days after the injection. This is not abnormal.  You may use acetaminophen, ibuprofen, or prescription medication that your doctor may have prescribed for you if you need to do so.     A few common side effects of steroids include facial flushing, sweating, restlessness, irritability,difficulty sleeping, increase in blood  sugar, and increased blood pressure. If you have diabetes, please monitor your blood sugar at least once a day for at least 5 days. If you have poorly controlled high blood pressure, monitoryour blood pressure for at least 2 days and contact your primary care physician if these numbers are unusually high for you.      If you take aspirin or non-steroidal anti-inflammatory drugs (examples are Motrin, Advil, ibuprofen, Naprosyn, Voltaren, Relafen, etc.) you may restart these this evening.    You do not need to discontinue non-aspirin-containing pain medications prior to an injection (examples: Celebrex, tramadol, hydrocodone and acetaminophen).      If you take a blood thinning medication (Coumadin, Lovenox, Fragmin,Ticlid, Plavix, Pradaxa, etc.), please discuss this with your primary care physician/cardiologist and your pain physician. These medications MUST be discontinued before you can have an injection safely, without the risk of uncontrolled bleeding. If these medications are not discontinued for an appropriate period of time, you will not be able to receivean injection.      If you are taking Coumadin, please have your INR checked the morning of your procedure and bringthe result to your appointment unless otherwise instructed. If your INR is over 1.2, your injection may need to be rescheduled to avoid uncontrolled bleeding from the needle placement.     Call [facility] at [phone] between 8am-4pm Monday - Friday if you are experiencing the following:    If you received an epidural or spinal injection:    -Headache that doesnot go away with medicine, is worse when sitting or standing up, and is greatly relieved upon lying down.   -Severe pain worse than or different than your baseline pain.   -Chills or fever (101º F or greater).   -Drainage or signs of infection at the injection site     Go directly to the Emergency Department if you are experiencing the following and received an  epidural or spinal injection:   -Abrupt weakness or progressive weakness in your legs that starts after you leave the clinic.   -Abrupt severe or worsening numbness in your legs.   -Inability to urinate after the injection or loss of bowel or bladder control without the urge to defecate or urinate.     If you have a clinical question that cannot wait until your next appointment, please call [phone] between 8am-4pm Monday - Friday or send a [employer] message. We do our best to return all non-emergency messages within 24 hours, Monday - Friday. A nurse or physician will return your message.      If you need to cancel an appointment, please call the scheduling staff at [phone] during normal business hours or leave a message at least 24 hours in advance.     If you are going to be sedated for your next procedure, you MUST have responsible adult who can legally drive accompany you home. You cannot eat or drink for eight hours prior to the planned procedure if you are going to receive sedation. You may take your non-blood thinning medications with a small sip of water.

## 2024-05-07 NOTE — OP NOTE
* No procedures listed * Operative Note     Date: 2024  OR Location: [facility] GI Lab Endosc1 OR    Name: Reji Simons, : 1955, Age: 69 y.o., MRN: [id number], Sex: male    Diagnosis  * No Diagnosis Codes entered * * No Diagnosis Codes entered *     Procedures  * No procedures documented on diagnosis form *    Surgeons   * No surgeons found in log *    Resident/Fellow/Other Assistant:  * No surgeons found in log *    Procedure Summary  Anesthesia: * No anesthesia type entered *  ASA: ASA status not filed in the log.  Anesthesia Staff: No anesthesia staff entered.  Estimated Blood Loss: 0mL  Intra-op Medications: * Intraprocedure medication information is unavailable because the case start and end events have not been set *      Intraprocedure I/O Totals       None           Specimen: No specimens collected     Staff:   No surgical staff documented.         Drains and/or Catheters: * None in log *    Tourniquet Times:         Implants:     Findings:     Indications: Reji Simons is an 69 y.o. male who is having surgery for * No pre-op diagnosis entered *.     The patient was seen in the preoperative area. The risks, benefits, complications, treatment options, non-operative alternatives, expected recovery and outcomes were discussed with the patient. The possibilities of reaction to medication, pulmonary aspiration, injury to surrounding structures, bleeding, recurrent infection, the need for additional procedures, failure to diagnose a condition, and creating a complication requiring transfusion or operation were discussed with the patient. The patient concurred with the proposed plan, giving informed consent.  The site of surgery was properly noted/marked if necessary per policy. The patient has been actively warmed in preoperative area. Preoperative antibiotics are not indicated. Venous thrombosis prophylaxis are not indicated.    Procedure Details: The patient was brought to the procedure room and placed in  prone position.  Sterile prep and drape with ChloraPrep and a fenestrated drape.  8 mL of half percent lidocaine were injected through a 25-gauge spinal needle for local anesthesia.  An 18-gauge Touhy needle was guided to the T10-11 interlaminar epidural space by loss-of-resistance technique under fluoroscopic guidance.  Contrast was injected under live fluoroscopy to ensure proper needle placement.  Then 60 mg of triamcinolone and 2 mL of half percent lidocaine were injected through the needle.  The needle was removed and the patient was transferred to recovery.   Complications:  None; patient tolerated the procedure well.    Disposition: PACU - hemodynamically stable.  Condition: stable         Additional Details:     Attending Attestation: I performed the procedure.    *No primary surgeon found*

## 2024-05-29 ENCOUNTER — PATIENT MESSAGE (OUTPATIENT)
Dept: PAIN MEDICINE | Facility: CLINIC | Age: 69
End: 2024-05-29
Payer: MEDICARE

## 2024-06-05 DIAGNOSIS — M96.1 LUMBAR POST-LAMINECTOMY SYNDROME: ICD-10-CM

## 2024-06-05 RX ORDER — OXYCODONE AND ACETAMINOPHEN 7.5; 325 MG/1; MG/1
1 TABLET ORAL EVERY 6 HOURS PRN
Qty: 120 TABLET | Refills: 0 | Status: SHIPPED | OUTPATIENT
Start: 2024-06-05 | End: 2024-07-05

## 2024-07-17 DIAGNOSIS — M96.1 LUMBAR POST-LAMINECTOMY SYNDROME: ICD-10-CM

## 2024-07-17 RX ORDER — OXYCODONE AND ACETAMINOPHEN 7.5; 325 MG/1; MG/1
1 TABLET ORAL EVERY 6 HOURS PRN
Qty: 120 TABLET | Refills: 0 | Status: SHIPPED | OUTPATIENT
Start: 2024-07-17 | End: 2024-08-16

## 2024-07-24 ENCOUNTER — PATIENT MESSAGE (OUTPATIENT)
Dept: GASTROENTEROLOGY | Facility: HOSPITAL | Age: 69
End: 2024-07-24
Payer: MEDICARE

## 2024-08-01 ENCOUNTER — ANCILLARY PROCEDURE (OUTPATIENT)
Dept: RADIOLOGY | Facility: EXTERNAL LOCATION | Age: 69
End: 2024-08-01
Payer: MEDICARE

## 2024-08-01 DIAGNOSIS — M25.552 PAIN IN LEFT HIP: ICD-10-CM

## 2024-08-01 PROCEDURE — 20610 DRAIN/INJ JOINT/BURSA W/O US: CPT | Performed by: ANESTHESIOLOGY

## 2024-08-01 NOTE — PROGRESS NOTES
Pre and postprocedure diagnosis--left hip pain    Procedure--left hip injection    Anesthesia--local    Complications--none    Clinical note--the patient has a history of pain.  I explained the risks, benefits, and alternatives of the procedure to the patient.  The patient wishes to proceed.    Procedure Note--The patient was brought to the procedure room and placed in the right lateral decubitus position.  Sterile prep and drape with ChloraPrep and a fenestrated drape.  5 mL of half percent lidocaine were injected through a 25-gauge spinal needle for local anesthesia.  A 22-gauge spinal needle was guided to the intra-articular aspect of the left hip.  Contrast was injected under live fluoroscopy to ensure proper needle placement.  Then 40 mg of triamcinolone and 5 mL of half percent lidocaine were injected through the needle.  The needle was removed and the patient was transferred to recovery.

## 2024-09-03 DIAGNOSIS — M96.1 LUMBAR POST-LAMINECTOMY SYNDROME: ICD-10-CM

## 2024-09-04 RX ORDER — OXYCODONE AND ACETAMINOPHEN 7.5; 325 MG/1; MG/1
1 TABLET ORAL EVERY 6 HOURS PRN
Qty: 120 TABLET | Refills: 0 | Status: SHIPPED | OUTPATIENT
Start: 2024-09-04 | End: 2024-10-04

## 2024-09-30 DIAGNOSIS — M96.1 LUMBAR POST-LAMINECTOMY SYNDROME: ICD-10-CM

## 2024-09-30 RX ORDER — OXYCODONE AND ACETAMINOPHEN 7.5; 325 MG/1; MG/1
1 TABLET ORAL EVERY 6 HOURS PRN
Qty: 120 TABLET | Refills: 0 | Status: SHIPPED | OUTPATIENT
Start: 2024-10-04 | End: 2024-11-03

## 2024-09-30 RX ORDER — OXYCODONE AND ACETAMINOPHEN 7.5; 325 MG/1; MG/1
1 TABLET ORAL EVERY 6 HOURS PRN
Qty: 120 TABLET | Refills: 0 | OUTPATIENT
Start: 2024-09-30 | End: 2024-10-30

## 2024-10-08 ENCOUNTER — PREP FOR PROCEDURE (OUTPATIENT)
Dept: OPERATING ROOM | Facility: HOSPITAL | Age: 69
End: 2024-10-08
Payer: MEDICARE

## 2024-10-08 DIAGNOSIS — M96.1 POSTLAMINECTOMY SYNDROME, LUMBAR REGION: Primary | ICD-10-CM

## 2024-10-24 ENCOUNTER — TELEPHONE (OUTPATIENT)
Dept: PAIN MEDICINE | Facility: CLINIC | Age: 69
End: 2024-10-24
Payer: MEDICARE

## 2024-10-25 ENCOUNTER — APPOINTMENT (OUTPATIENT)
Dept: OTOLARYNGOLOGY | Facility: CLINIC | Age: 69
End: 2024-10-25
Payer: MEDICARE

## 2024-10-31 ENCOUNTER — ANCILLARY PROCEDURE (OUTPATIENT)
Dept: RADIOLOGY | Facility: EXTERNAL LOCATION | Age: 69
End: 2024-10-31
Payer: MEDICARE

## 2024-10-31 DIAGNOSIS — M96.1 POSTLAMINECTOMY SYNDROME, NOT ELSEWHERE CLASSIFIED: ICD-10-CM

## 2024-10-31 PROCEDURE — 62323 NJX INTERLAMINAR LMBR/SAC: CPT | Performed by: ANESTHESIOLOGY

## 2024-10-31 NOTE — H&P (VIEW-ONLY)
Pre and postprocedure diagnosis--lumbar postlaminectomy syndrome    Procedure--caudal epidural steroid injectioin    Anesthesia--local    Complications--none    Clinical note--the patient has a history of pain.  I explained the risks, benefits, and alternatives of the procedure to the patient.  The patient wishes to proceed.    Procedure Note--The patient was brought to the procedure room and placed in the prone position.  Sterile prep and drape with ChloraPrep and sterile towels.  6 mL of half percent lidocaine were injected through a 25-gauge needle for local anesthesia.  A 22-gauge spinal needle was guided through the sacral hiatus to the caudal epidural space.  After negative aspiration, 1 mL of contrast was injected through the needle to ensure proper needle placement.  Then 8 mL of 0.25% percent lidocaine and 60 mg of triamcinolone were injected through the needle.  The needle was removed and the patient was transferred to recovery.

## 2024-11-05 ENCOUNTER — APPOINTMENT (OUTPATIENT)
Dept: UROLOGY | Facility: CLINIC | Age: 69
End: 2024-11-05
Payer: MEDICARE

## 2024-11-06 ENCOUNTER — APPOINTMENT (OUTPATIENT)
Dept: OTOLARYNGOLOGY | Facility: CLINIC | Age: 69
End: 2024-11-06
Payer: MEDICARE

## 2024-11-06 DIAGNOSIS — M96.1 LUMBAR POST-LAMINECTOMY SYNDROME: ICD-10-CM

## 2024-11-06 RX ORDER — OXYCODONE AND ACETAMINOPHEN 7.5; 325 MG/1; MG/1
1 TABLET ORAL EVERY 6 HOURS PRN
Qty: 120 TABLET | Refills: 0 | Status: SHIPPED | OUTPATIENT
Start: 2024-11-06 | End: 2024-12-06

## 2024-11-11 ENCOUNTER — PREP FOR PROCEDURE (OUTPATIENT)
Dept: PAIN MEDICINE | Facility: CLINIC | Age: 69
End: 2024-11-11
Payer: MEDICARE

## 2024-11-11 DIAGNOSIS — G89.29 CHRONIC RIGHT HIP PAIN: Primary | ICD-10-CM

## 2024-11-11 DIAGNOSIS — M25.551 CHRONIC RIGHT HIP PAIN: Primary | ICD-10-CM

## 2024-11-12 ENCOUNTER — HOSPITAL ENCOUNTER (OUTPATIENT)
Dept: GASTROENTEROLOGY | Facility: HOSPITAL | Age: 69
Discharge: HOME | End: 2024-11-12
Payer: MEDICARE

## 2024-11-12 VITALS
HEIGHT: 68 IN | BODY MASS INDEX: 32.58 KG/M2 | SYSTOLIC BLOOD PRESSURE: 143 MMHG | HEART RATE: 87 BPM | WEIGHT: 215 LBS | OXYGEN SATURATION: 97 % | RESPIRATION RATE: 20 BRPM | TEMPERATURE: 96.6 F | DIASTOLIC BLOOD PRESSURE: 77 MMHG

## 2024-11-12 DIAGNOSIS — G89.29 CHRONIC RIGHT HIP PAIN: ICD-10-CM

## 2024-11-12 DIAGNOSIS — M25.551 CHRONIC RIGHT HIP PAIN: ICD-10-CM

## 2024-11-12 PROCEDURE — 2500000004 HC RX 250 GENERAL PHARMACY W/ HCPCS (ALT 636 FOR OP/ED): Performed by: ANESTHESIOLOGY

## 2024-11-12 PROCEDURE — 20610 DRAIN/INJ JOINT/BURSA W/O US: CPT | Performed by: ANESTHESIOLOGY

## 2024-11-12 PROCEDURE — 2550000001 HC RX 255 CONTRASTS: Performed by: ANESTHESIOLOGY

## 2024-11-12 RX ORDER — TRIAMCINOLONE ACETONIDE 40 MG/ML
INJECTION, SUSPENSION INTRA-ARTICULAR; INTRAMUSCULAR AS NEEDED
Status: COMPLETED | OUTPATIENT
Start: 2024-11-12 | End: 2024-11-12

## 2024-11-12 RX ORDER — LIDOCAINE HYDROCHLORIDE 5 MG/ML
INJECTION, SOLUTION INFILTRATION; INTRAVENOUS AS NEEDED
Status: COMPLETED | OUTPATIENT
Start: 2024-11-12 | End: 2024-11-12

## 2024-11-12 ASSESSMENT — PAIN SCALES - GENERAL
PAINLEVEL_OUTOF10: 10 - WORST POSSIBLE PAIN
PAINLEVEL_OUTOF10: 6

## 2024-11-12 ASSESSMENT — ENCOUNTER SYMPTOMS
OCCASIONAL FEELINGS OF UNSTEADINESS: 1
LOSS OF SENSATION IN FEET: 0
DEPRESSION: 0

## 2024-11-12 ASSESSMENT — PAIN - FUNCTIONAL ASSESSMENT
PAIN_FUNCTIONAL_ASSESSMENT: 0-10
PAIN_FUNCTIONAL_ASSESSMENT: 0-10

## 2024-11-12 ASSESSMENT — PAIN DESCRIPTION - DESCRIPTORS: DESCRIPTORS: ACHING

## 2024-11-12 NOTE — DISCHARGE INSTRUCTIONS
You underwent a procedure today    After most procedures, it is recommended that you relax and limit your activity for the remainder of the day unless you have been told otherwise by your pain physician.  You should not drive a car, operate machinery, or make important legal decisions unless otherwise directed by your pain physician.  You may resume your normal activity, including exercise, tomorrow.      Keep a written pain diary of how much pain relief you experienced following the procedure and the length of time of pain relief you experienced pain relief. Following diagnostic injections like medial branch nerve blocks, sacroiliac joint blocks, stellate ganglion injections and other blocks, it is very important you record the specific amount of pain relief you experienced immediately after the injectionand how long it lasted. Your doctor will ask you for this information at your follow up visit.     For all injections, please keep the injection site dry and inspect the site for a couple of days. You may remove the Band-Aid the day of the injection at any time.     Some discomfort, bruising or slight swelling may occur at the injection site. This is not abnormal if it occurs.  If needed you may:    -Take over the counter medication such as Tylenol or Motrin.   -Apply an ice pack for 30 minutes, 2 to 3 times a day for the first 24 hours.     You may shower today; no soaking baths, hot tubs, whirlpools or swimming pools for two days.      If you are given steroids in your injection, it may take 3-5 days for the steroid medication to take effect. You may notice a worsening of your symptoms for 1-2 days after the injection. This is not abnormal.  You may use acetaminophen, ibuprofen, or prescription medication that your doctor may have prescribed for you if you need to do so.     A few common side effects of steroids include facial flushing, sweating, restlessness, irritability,difficulty sleeping, increase in blood  sugar, and increased blood pressure. If you have diabetes, please monitor your blood sugar at least once a day for at least 5 days. If you have poorly controlled high blood pressure, monitoryour blood pressure for at least 2 days and contact your primary care physician if these numbers are unusually high for you.      If you take aspirin or non-steroidal anti-inflammatory drugs (examples are Motrin, Advil, ibuprofen, Naprosyn, Voltaren, Relafen, etc.) you may restart these this evening.    You do not need to discontinue non-aspirin-containing pain medications prior to an injection (examples: Celebrex, tramadol, hydrocodone and acetaminophen).      If you take a blood thinning medication (Coumadin, Lovenox, Fragmin,Ticlid, Plavix, Pradaxa, etc.), please discuss this with your primary care physician/cardiologist and your pain physician. These medications MUST be discontinued before you can have an injection safely, without the risk of uncontrolled bleeding. If these medications are not discontinued for an appropriate period of time, you will not be able to receivean injection.      If you are taking Coumadin, please have your INR checked the morning of your procedure and bringthe result to your appointment unless otherwise instructed. If your INR is over 1.2, your injection may need to be rescheduled to avoid uncontrolled bleeding from the needle placement.     Call [facility] at [phone] between 8am-4pm Monday - Friday if you are experiencing the following:    If you received an epidural or spinal injection:    -Headache that doesnot go away with medicine, is worse when sitting or standing up, and is greatly relieved upon lying down.   -Severe pain worse than or different than your baseline pain.   -Chills or fever (101º F or greater).   -Drainage or signs of infection at the injection site     Go directly to the Emergency Department if you are experiencing the following and received an  epidural or spinal injection:   -Abrupt weakness or progressive weakness in your legs that starts after you leave the clinic.   -Abrupt severe or worsening numbness in your legs.   -Inability to urinate after the injection or loss of bowel or bladder control without the urge to defecate or urinate.     If you have a clinical question that cannot wait until your next appointment, please call [phone] between 8am-4pm Monday - Friday or send a [employer] message. We do our best to return all non-emergency messages within 24 hours, Monday - Friday. A nurse or physician will return your message.      If you need to cancel an appointment, please call the scheduling staff at [phone] during normal business hours or leave a message at least 24 hours in advance.     If you are going to be sedated for your next procedure, you MUST have responsible adult who can legally drive accompany you home. You cannot eat or drink for eight hours prior to the planned procedure if you are going to receive sedation. You may take your non-blood thinning medications with a small sip of water.

## 2024-11-21 NOTE — PROGRESS NOTES
Facial Plastic & Reconstructive Surgery    Reason for consult: Nasal obstruction *Interested in [facility]    Referring provider: self    Chief Complaint: Obstructed breathing    68yo male with PMHx of Interstitial Lung Disease, 2ppd current smoker, COPD, EMMANUELLE, CAD c/b STEMI x2 s/p 3 stents on Plavix, HTN, HLS, GRD, delayed emergence from general anesthesia, PE/DVTs, STROKE, and hx of prior rhinoplasty 1984.  Presents today for surgical evaluation of nasal airway obstruction.     Constant, present year round, does not fluctuate. It affects the patients ability to sleep, exercise, and is troubling during the day.  Symptoms began: years ago    Nasal steroid or spray: has attempted nasal sprays in the past    Site of obstruction: bilateral    Previous Otolaryngology Provider: None  Previous documentation for this patient was extensively reviewed including specific reasons for evaluation. Complex nature of complaint and medical issues prompting evaluation for surgical consideration by facial plastic & reconstructive surgeon.    Previous surgery: 3 x previous septorhinoplasties      Trauma history: multiple nasal traumas    Sleep apnea or snoring history: +snoring EMMANUELLE    Allergic rhinitis, sinusitis history: frequent congestion    Smoking: endorses    Aesthetic concern: denies    Past Medical History  He has a past medical history of Lumbar pain.    Surgical History  He has no past surgical history on file.     Social History  He reports that he has been smoking cigarettes. He started smoking about 31 years ago. He has a 63.8 pack-year smoking history. He has never used smokeless tobacco. He reports current alcohol use of about 4.0 standard drinks of alcohol per week. He reports that he does not currently use drugs.    Family History  Family History   Problem Relation Name Age of Onset    Diabetes Mother      Stroke Mother      Prostate cancer Father          Allergies  Titanium    Physical exam    General:  Well-developed and well-nourished in appearance.  Skin: No rashes or concerning lesions on the visible portions of the skin.  Eyes: Extraocular movements intact. Visual fields grossly normal.  Ears: Pinna are normal in shape and position. External canals are patent.  Oral Cavity/Oropharynx: Dentition is intact. Mucous membranes moist. No masses or lesions.  Respiratory: No respiratory distress. Quiet breathing without stertor or stridor.  Cardiovascular: Regular rate and rhythm. Warm extremities with equal pulses.  Psych: Normal mood and affect. Judgement and insight appropriate.  Neuro: Alert and oriented. CN II-XII grossly intact. No focal deficits.  Musculoskeletal: Gait intact. Moves all extremities well without apparent deformities.    A comprehensive facial exam was performed with the following highlights:    Nasal skin type: moderate thickness    External exam:  Saddle nose  Middle vault collapse  Inverted v deformity  Scar over dorsum    Internal exam:   Septum: deviations present with caudal deflection to the right  Inferior turbinates: hypertrophied bl  Nasal valve angle: reduced bilaterally with static alar collapse                      Intranasal examination performed with limited view on anterior rhinoscopy.    Procedures:    Procedure: Rigid diagnostic nasal endoscopy  Surgeon: Rey Gregory MD  Anesthesia: None  Timeout: Performed  Findings: A 0-degree rigid endoscope was passed through the patient's bilateral naris. The first pass was along the floor of the nose to the nasopharynx. The second pass was to the area of the middle meatus. The third pass was to the sphenoethmoidal recess.    Septum: Deviation is S shaped with bilateral obstruction approximately 90% without perforations nor synechia.  Internal Nasal Valve: Angle is reduced, narrowing the nasal airway bilaterally.    Right nasal cavity:  Inferior turbinate: 2+  Inferior meatus: Clear, no discharge, no polyps/masses/lesions  Middle meatus:  Clear, no discharge, no polyps/masses/lesions    Left nasal cavity:  Inferior turbinate: 2+  Inferior meatus: Clear, no discharge, no polyps/masses/lesions  Middle meatus: Clear, no discharge, no polyps/masses/lesions  Nasopharynx: Clear, no discharge, no masses/lesions    Modified McCone Maneuver: Performed with a cotton tipped applicator, markedly improves breathing bilaterally.    Assessment - This patient has a significant mechanical nasal obstruction. The cause is multifactorial, with an obvious nasal deformity, nasal bone deflection, septal deviation with severe internal nasal valve narrowing, turbinate hypertrophy, and static internal nasal valve collapse. There is some dynamic nasal valve collapse as well. Correction of this nasal obstruction will require a reconstructive rhinoplasty with major septoplasty, repair of nasal valve collapse with structural grafting, and a bilateral turbinate reduction. We discussed the medical necessity of this at length, as well as the risks and limitations of the procedures. All questions were answered.      Plan - Recommend open revision reconstructive rhinoplasty with septal repair with septoplasty, facial bone contouring with placement of composite bone and cartilage dorsal onlay with suspension of the nasal valve, lateral crural strut grafting, autolgoous rib graft, possible septal/ear grafts, and inferior turbinate reduction with lateralization.

## 2024-11-22 ENCOUNTER — APPOINTMENT (OUTPATIENT)
Dept: OTOLARYNGOLOGY | Facility: CLINIC | Age: 69
End: 2024-11-22
Payer: MEDICARE

## 2024-11-22 DIAGNOSIS — Z98.890 H/O NASAL SEPTOPLASTY: ICD-10-CM

## 2024-11-22 DIAGNOSIS — M95.0 NASAL DEFORMITY: ICD-10-CM

## 2024-11-22 DIAGNOSIS — J34.2 DEVIATED SEPTUM: Primary | ICD-10-CM

## 2024-11-22 DIAGNOSIS — J34.8210 NASAL ALAR COLLAPSE: ICD-10-CM

## 2024-11-22 DIAGNOSIS — G47.30 SLEEP-DISORDERED BREATHING: ICD-10-CM

## 2024-11-22 DIAGNOSIS — R09.81 NASAL CONGESTION: ICD-10-CM

## 2024-11-22 DIAGNOSIS — R06.89 DIFFICULTY BREATHING: ICD-10-CM

## 2024-11-22 DIAGNOSIS — J34.89 NASAL OBSTRUCTION: ICD-10-CM

## 2024-11-22 DIAGNOSIS — J34.3 HYPERTROPHY OF INFERIOR NASAL TURBINATE: ICD-10-CM

## 2024-11-22 DIAGNOSIS — J34.2 DEVIATED NASAL SEPTUM: ICD-10-CM

## 2024-11-22 PROCEDURE — 99205 OFFICE O/P NEW HI 60 MIN: CPT | Performed by: OTOLARYNGOLOGY

## 2024-11-22 PROCEDURE — 1159F MED LIST DOCD IN RCRD: CPT | Performed by: OTOLARYNGOLOGY

## 2024-11-22 PROCEDURE — 31231 NASAL ENDOSCOPY DX: CPT | Performed by: OTOLARYNGOLOGY

## 2024-11-22 PROCEDURE — 1126F AMNT PAIN NOTED NONE PRSNT: CPT | Performed by: OTOLARYNGOLOGY

## 2024-11-22 ASSESSMENT — PAIN SCALES - GENERAL: PAINLEVEL_OUTOF10: 0-NO PAIN

## 2024-11-22 ASSESSMENT — COLUMBIA-SUICIDE SEVERITY RATING SCALE - C-SSRS: 1. IN THE PAST MONTH, HAVE YOU WISHED YOU WERE DEAD OR WISHED YOU COULD GO TO SLEEP AND NOT WAKE UP?: NO

## 2024-11-22 ASSESSMENT — PATIENT HEALTH QUESTIONNAIRE - PHQ9
2. FEELING DOWN, DEPRESSED OR HOPELESS: NOT AT ALL
1. LITTLE INTEREST OR PLEASURE IN DOING THINGS: NOT AT ALL
SUM OF ALL RESPONSES TO PHQ9 QUESTIONS 1 AND 2: 0

## 2024-11-26 ENCOUNTER — APPOINTMENT (OUTPATIENT)
Dept: UROLOGY | Facility: CLINIC | Age: 69
End: 2024-11-26
Payer: MEDICARE

## 2024-11-26 VITALS — TEMPERATURE: 96.4 F

## 2024-11-26 DIAGNOSIS — N48.6 PEYRONIE DISEASE: ICD-10-CM

## 2024-11-26 DIAGNOSIS — N52.01 ERECTILE DYSFUNCTION DUE TO ARTERIAL INSUFFICIENCY: Primary | ICD-10-CM

## 2024-11-26 PROCEDURE — 1126F AMNT PAIN NOTED NONE PRSNT: CPT | Performed by: UROLOGY

## 2024-11-26 PROCEDURE — 99204 OFFICE O/P NEW MOD 45 MIN: CPT | Performed by: UROLOGY

## 2024-11-26 PROCEDURE — 1159F MED LIST DOCD IN RCRD: CPT | Performed by: UROLOGY

## 2024-11-26 ASSESSMENT — PAIN SCALES - GENERAL: PAINLEVEL_OUTOF10: 0-NO PAIN

## 2024-11-26 NOTE — PROGRESS NOTES
HPI:  69 y.o. yo male patient complains of  erectile dysfunction    NPV for ED, PD, Low T  -interested in vascular study  -on ICI and cialis 10 daily (LUTS)  -PD, Hourglass shaped  -has already had xiaflex but didn't help him  -on IM T  -cardiac and pulm hx  -previously offered implant-declined at   -declined plication at   - had Doppler in 2020 with Amaury Dyson showing arterial insufficiency    [facility] notes x 5  reviewed  Urology notes 10/24 from [facility] reviewed    s/p prostatectomy:   On nitrates/NTG?: No  Have been on Testosterone /anabolic steroids? On T        Lab Results   Component Value Date    HGBA1C 6.1 (H) 07/09/2023     CBC, CMP reviewed    PMH:  Past Medical History:   Diagnosis Date    Lumbar pain         PSH:  No past surgical history on file.     Medications:    Current Outpatient Medications:     alendronate (Fosamax) 35 mg tablet, 1 tablet 30 minutes before the first food, beverage or medicine of the day with plain water Orally for 30 day(s), Disp: , Rfl:     aspirin (ASPIR-LOW ORAL), 81 MG  - 1 tablet Orally Once a day for 30 day(s), Disp: , Rfl:     carvedilol (Coreg) 3.125 mg tablet, Take 1 tablet (3.125 mg) by mouth 2 times daily (morning and late afternoon)., Disp: , Rfl:     citalopram (CeleXA) 10 mg tablet, 1 tablet Orally Once a day for 30 day(s), Disp: , Rfl:     clopidogrel (Plavix) 75 mg tablet, Take 1 tablet (75 mg) by mouth once daily., Disp: , Rfl:     gabapentin (Neurontin) 300 mg capsule, Take 1 capsule (300 mg) by mouth 3 times a day., Disp: , Rfl:     nitroglycerin (Nitrostat) 0.4 mg SL tablet, as directed Sublingual, Disp: , Rfl:     oxyCODONE-acetaminophen (Percocet) 7.5-325 mg tablet, Take 1 tablet by mouth every 6 hours if needed for severe pain (7 - 10)., Disp: 120 tablet, Rfl: 0    pantoprazole (ProtoNix) 40 mg EC tablet, Take 1 tablet (40 mg) by mouth 2 times a day., Disp: , Rfl:     predniSONE (Deltasone) 5 mg tablet, Take 1 tablet (5 mg) by mouth  once daily., Disp: , Rfl:     tadalafil (Cialis) 20 mg tablet, Take 0.5 tablets (10 mg) by mouth if needed., Disp: , Rfl:     testosterone cypionate (Depo-Testosterone) 200 mg/mL injection, 1 ml Intramuscular, Disp: , Rfl:     Allergy:  Allergies   Allergen Reactions    Titanium Unknown    Varenicline Unknown     Bad dreams, nausea        Exam  Testicles descended bilaterally, nontender, no masses  Vasa palpable bilaterally  Penis circ'd, no lesions, no plaques  Peyronie's plaque dorsally    Assessment/Plan  #Erectile Dysfunction: I discussed the etiology and different treatment modalities for erectile dysfunction. Specifically, we talked about lifestyle factors like smoking, diet, and exercise. We also discussed ED as a sign of systemic disease, and the contributions of elevated cholesterol and elevated blood sugars on erections. We then discussed treatment modalities, specifically PDE5 inhibitors, intracavernosal injections, vacuum erectile devices, and penile prostheses.  - failed pills and injection  -Doppler in 2020 with Amaury Karis--arterial insufficiency   - will refer him to Dr Duncan in cardiology for angiogram/potential stent  -may contact us to proceed with further discuss IPP if stenting not an option    We had a long discussion regarding penile prosthesis, including risks, benefits, and alternatives. We discussed risks including but not limited to pain, bleeding, infection, damage to adjacent structures, need for further procedures, malfunction, erosion, extrusion, complications of anesthesia etc. We discussed the postoperative course and expectations, and specifically that after getting an implant, spontaneous erections do not occur other methods such as injections etc are no longer effective. We also discussed the effect of the implant on length and that it will likely be shorter than previous given age and duration of ED. Further, if he gets an infection or the implant has to be removed for any  reason, there is potential for him to never have erections again. All questions were answered and reading materials were given. The patient was given models of both the inflatable and malleable implants, and his ability to squeeze the pump was also assessed.         #Peyronies disease   We discussed the etiology and natural progression of PD. Also discussed treatment options, including observation, xiaflex, plication, and excision of plaque and graft, and their risks and benefits.   - has had Doppler in 2020 showing arterial insufficiency as well, likely needs IPP    Scribe Attestation  By signing my name below, I, Jessica Adriana Rivas , Scribe   attest that this documentation has been prepared under the direction and in the presence of Oneal Ordonez MD.

## 2024-12-04 ENCOUNTER — PREP FOR PROCEDURE (OUTPATIENT)
Dept: PAIN MEDICINE | Facility: CLINIC | Age: 69
End: 2024-12-04
Payer: MEDICARE

## 2024-12-04 ENCOUNTER — TELEPHONE (OUTPATIENT)
Dept: PAIN MEDICINE | Facility: CLINIC | Age: 69
End: 2024-12-04
Payer: MEDICARE

## 2024-12-04 DIAGNOSIS — M54.16 LUMBAR RADICULOPATHY: Primary | ICD-10-CM

## 2024-12-04 RX ORDER — KETOROLAC TROMETHAMINE 10 MG/1
10 TABLET, FILM COATED ORAL 2 TIMES DAILY PRN
Qty: 10 TABLET | Refills: 0 | Status: SHIPPED | OUTPATIENT
Start: 2024-12-04 | End: 2024-12-09

## 2024-12-04 NOTE — TELEPHONE ENCOUNTER
Patient made aware of Toradol prescription. Patient voiced understanding and denied any questions or concerns.

## 2024-12-04 NOTE — TELEPHONE ENCOUNTER
Patient called in stating he is having severe right sided sciatica type pain. The pain starts in his lower back on the right side and radiates down his buttocks and thigh but does not go further than the knee. Patient is willing to do an [facility]. Patient takes prednisone 5 mg daily for gout. Patient states that the percocet helps but he cannot take it during the day at work because it makes him sleepy. Is there anything the patient can be prescribed to help the pain while waiting on the injection on 12-10?

## 2024-12-10 ENCOUNTER — HOSPITAL ENCOUNTER (OUTPATIENT)
Dept: GASTROENTEROLOGY | Facility: HOSPITAL | Age: 69
Discharge: HOME | End: 2024-12-10
Payer: MEDICARE

## 2024-12-10 VITALS
SYSTOLIC BLOOD PRESSURE: 104 MMHG | DIASTOLIC BLOOD PRESSURE: 85 MMHG | OXYGEN SATURATION: 99 % | BODY MASS INDEX: 33.34 KG/M2 | TEMPERATURE: 95.7 F | WEIGHT: 220 LBS | RESPIRATION RATE: 16 BRPM | HEIGHT: 68 IN | HEART RATE: 105 BPM

## 2024-12-10 DIAGNOSIS — M54.16 LUMBAR RADICULOPATHY: ICD-10-CM

## 2024-12-10 PROCEDURE — 64483 NJX AA&/STRD TFRM EPI L/S 1: CPT | Performed by: ANESTHESIOLOGY

## 2024-12-10 PROCEDURE — 64483 NJX AA&/STRD TFRM EPI L/S 1: CPT | Mod: MUE | Performed by: ANESTHESIOLOGY

## 2024-12-10 PROCEDURE — 2500000004 HC RX 250 GENERAL PHARMACY W/ HCPCS (ALT 636 FOR OP/ED): Performed by: ANESTHESIOLOGY

## 2024-12-10 PROCEDURE — 64484 NJX AA&/STRD TFRM EPI L/S EA: CPT | Performed by: ANESTHESIOLOGY

## 2024-12-10 PROCEDURE — 2550000001 HC RX 255 CONTRASTS: Performed by: ANESTHESIOLOGY

## 2024-12-10 RX ORDER — LIDOCAINE HYDROCHLORIDE 5 MG/ML
INJECTION, SOLUTION INFILTRATION; INTRAVENOUS AS NEEDED
Status: COMPLETED | OUTPATIENT
Start: 2024-12-10 | End: 2024-12-10

## 2024-12-10 RX ORDER — TRIAMCINOLONE ACETONIDE 40 MG/ML
INJECTION, SUSPENSION INTRA-ARTICULAR; INTRAMUSCULAR AS NEEDED
Status: COMPLETED | OUTPATIENT
Start: 2024-12-10 | End: 2024-12-10

## 2024-12-10 ASSESSMENT — ENCOUNTER SYMPTOMS
LOSS OF SENSATION IN FEET: 0
DEPRESSION: 0
OCCASIONAL FEELINGS OF UNSTEADINESS: 1

## 2024-12-10 ASSESSMENT — PAIN - FUNCTIONAL ASSESSMENT
PAIN_FUNCTIONAL_ASSESSMENT: 0-10
PAIN_FUNCTIONAL_ASSESSMENT: 0-10

## 2024-12-10 ASSESSMENT — PAIN DESCRIPTION - DESCRIPTORS: DESCRIPTORS: SHARP

## 2024-12-10 ASSESSMENT — PAIN SCALES - GENERAL
PAINLEVEL_OUTOF10: 7
PAINLEVEL_OUTOF10: 0 - NO PAIN

## 2024-12-10 NOTE — Clinical Note
Patient scheduled for right sided lumbar transforaminal epidural, but patient states he should be having left sided. Plan discussed with Dr. Damon to perform left sided L5/S1 lumbar transforaminal epidural. Consent form signed for left sided procedure.

## 2024-12-10 NOTE — H&P
"History Of Present Illness  Reji Simons is a 69 y.o. male presenting with low back and right leg pain.     Past Medical History  Past Medical History:   Diagnosis Date    Lumbar pain        Surgical History  No past surgical history on file.     Social History  He reports that he has been smoking cigarettes. He started smoking about 31 years ago. He has a 63.9 pack-year smoking history. He has never used smokeless tobacco. He reports current alcohol use of about 4.0 standard drinks of alcohol per week. He reports that he does not currently use drugs.    Family History  Family History   Problem Relation Name Age of Onset    Diabetes Mother      Stroke Mother      Prostate cancer Father          Allergies  Titanium and Varenicline    Review of Systems     Physical Exam     Last Recorded Vitals  Blood pressure 109/68, pulse 88, temperature 35.4 °C (95.7 °F), temperature source Temporal, resp. rate 17, height 1.727 m (5' 8\"), weight 99.8 kg (220 lb), SpO2 94%.    Relevant Results             Assessment/Plan   Assessment & Plan  Lumbar radiculopathy      Right L5 and S1 transforaminal epidural steroid injection       I spent 10 minutes in the professional and overall care of this patient.      Mika Damon MD    "

## 2024-12-10 NOTE — INTERVAL H&P NOTE
H&P reviewed. The patient was examined.  We are addressing the left side as the left pain is the predominant issue.  Left L5 and S1 transforaminal epidural steroid injection

## 2024-12-10 NOTE — DISCHARGE INSTRUCTIONS
You underwent a procedure today    After most procedures, it is recommended that you relax and limit your activity for the remainder of the day unless you have been told otherwise by your pain physician.  You should not drive a car, operate machinery, or make important legal decisions unless otherwise directed by your pain physician.  You may resume your normal activity, including exercise, tomorrow.      Keep a written pain diary of how much pain relief you experienced following the procedure and the length of time of pain relief you experienced pain relief. Following diagnostic injections like medial branch nerve blocks, sacroiliac joint blocks, stellate ganglion injections and other blocks, it is very important you record the specific amount of pain relief you experienced immediately after the injectionand how long it lasted. Your doctor will ask you for this information at your follow up visit.     For all injections, please keep the injection site dry and inspect the site for a couple of days. You may remove the Band-Aid the day of the injection at any time.     Some discomfort, bruising or slight swelling may occur at the injection site. This is not abnormal if it occurs.  If needed you may:    -Take over the counter medication such as Tylenol or Motrin.   -Apply an ice pack for 30 minutes, 2 to 3 times a day for the first 24 hours.     You may shower today; no soaking baths, hot tubs, whirlpools or swimming pools for two days.      If you are given steroids in your injection, it may take 3-5 days for the steroid medication to take effect. You may notice a worsening of your symptoms for 1-2 days after the injection. This is not abnormal.  You may use acetaminophen, ibuprofen, or prescription medication that your doctor may have prescribed for you if you need to do so.     A few common side effects of steroids include facial flushing, sweating, restlessness, irritability,difficulty sleeping, increase in blood  sugar, and increased blood pressure. If you have diabetes, please monitor your blood sugar at least once a day for at least 5 days. If you have poorly controlled high blood pressure, monitoryour blood pressure for at least 2 days and contact your primary care physician if these numbers are unusually high for you.      If you take aspirin or non-steroidal anti-inflammatory drugs (examples are Motrin, Advil, ibuprofen, Naprosyn, Voltaren, Relafen, etc.) you may restart these this evening.    You do not need to discontinue non-aspirin-containing pain medications prior to an injection (examples: Celebrex, tramadol, hydrocodone and acetaminophen).      If you take a blood thinning medication (Coumadin, Lovenox, Fragmin,Ticlid, Plavix, Pradaxa, etc.), please discuss this with your primary care physician/cardiologist and your pain physician. These medications MUST be discontinued before you can have an injection safely, without the risk of uncontrolled bleeding. If these medications are not discontinued for an appropriate period of time, you will not be able to receivean injection.      If you are taking Coumadin, please have your INR checked the morning of your procedure and bringthe result to your appointment unless otherwise instructed. If your INR is over 1.2, your injection may need to be rescheduled to avoid uncontrolled bleeding from the needle placement.     Call [facility] at [phone] between 8am-4pm Monday - Friday if you are experiencing the following:    If you received an epidural or spinal injection:    -Headache that doesnot go away with medicine, is worse when sitting or standing up, and is greatly relieved upon lying down.   -Severe pain worse than or different than your baseline pain.   -Chills or fever (101º F or greater).   -Drainage or signs of infection at the injection site     Go directly to the Emergency Department if you are experiencing the following and received an  epidural or spinal injection:   -Abrupt weakness or progressive weakness in your legs that starts after you leave the clinic.   -Abrupt severe or worsening numbness in your legs.   -Inability to urinate after the injection or loss of bowel or bladder control without the urge to defecate or urinate.     If you have a clinical question that cannot wait until your next appointment, please call [phone] between 8am-4pm Monday - Friday or send a [employer] message. We do our best to return all non-emergency messages within 24 hours, Monday - Friday. A nurse or physician will return your message.      If you need to cancel an appointment, please call the scheduling staff at [phone] during normal business hours or leave a message at least 24 hours in advance.     If you are going to be sedated for your next procedure, you MUST have responsible adult who can legally drive accompany you home. You cannot eat or drink for eight hours prior to the planned procedure if you are going to receive sedation. You may take your non-blood thinning medications with a small sip of water.

## 2024-12-13 DIAGNOSIS — M96.1 LUMBAR POST-LAMINECTOMY SYNDROME: ICD-10-CM

## 2024-12-13 RX ORDER — OXYCODONE AND ACETAMINOPHEN 7.5; 325 MG/1; MG/1
1 TABLET ORAL EVERY 6 HOURS PRN
Qty: 120 TABLET | Refills: 0 | Status: SHIPPED | OUTPATIENT
Start: 2024-12-13 | End: 2025-01-12

## 2024-12-20 ENCOUNTER — TELEMEDICINE (OUTPATIENT)
Dept: PAIN MEDICINE | Facility: CLINIC | Age: 69
End: 2024-12-20
Payer: MEDICARE

## 2024-12-20 DIAGNOSIS — M25.551 CHRONIC PAIN OF BOTH HIPS: Primary | ICD-10-CM

## 2024-12-20 DIAGNOSIS — M25.552 CHRONIC PAIN OF BOTH HIPS: Primary | ICD-10-CM

## 2024-12-20 DIAGNOSIS — G89.29 CHRONIC PAIN OF BOTH HIPS: Primary | ICD-10-CM

## 2024-12-20 PROBLEM — M18.11 ARTHRITIS OF CARPOMETACARPAL (CMC) JOINT OF RIGHT THUMB: Status: ACTIVE | Noted: 2019-09-13

## 2024-12-20 PROBLEM — G89.4 CHRONIC PAIN SYNDROME: Status: ACTIVE | Noted: 2021-12-06

## 2024-12-20 PROBLEM — M48.061 SPINAL STENOSIS OF LUMBAR REGION WITHOUT NEUROGENIC CLAUDICATION: Status: ACTIVE | Noted: 2022-08-01

## 2024-12-20 PROBLEM — F41.8 OTHER SPECIFIED ANXIETY DISORDERS: Status: ACTIVE | Noted: 2018-08-27

## 2024-12-20 PROBLEM — M19.039 ARTHRITIS OF SCAPHO-TRAPEZIUM-TRAPEZOID JOINT: Status: ACTIVE | Noted: 2018-07-27

## 2024-12-20 PROBLEM — R76.8 ANA POSITIVE: Status: ACTIVE | Noted: 2018-10-02

## 2024-12-20 PROBLEM — M25.632 DECREASED RANGE OF MOTION OF LEFT WRIST: Status: ACTIVE | Noted: 2020-11-23

## 2024-12-20 PROBLEM — G47.00 INSOMNIA: Status: ACTIVE | Noted: 2018-05-09

## 2024-12-20 PROBLEM — I65.29 CAROTID ARTERY STENOSIS: Status: ACTIVE | Noted: 2024-10-10

## 2024-12-20 PROBLEM — M54.50 LOW BACK PAIN, UNSPECIFIED: Status: ACTIVE | Noted: 2023-08-01

## 2024-12-20 PROBLEM — I25.10 CAD IN NATIVE ARTERY: Status: ACTIVE | Noted: 2021-12-06

## 2024-12-20 PROBLEM — J43.9 EMPHYSEMA, UNSPECIFIED: Status: ACTIVE | Noted: 2024-12-20

## 2024-12-20 PROBLEM — M19.031 PRIMARY OSTEOARTHRITIS OF RIGHT WRIST: Status: ACTIVE | Noted: 2019-03-08

## 2024-12-20 PROBLEM — S32.009K LUMBAR PSEUDOARTHROSIS: Status: ACTIVE | Noted: 2024-02-21

## 2024-12-20 PROBLEM — M54.2 CHRONIC NECK PAIN: Status: ACTIVE | Noted: 2018-10-02

## 2024-12-20 PROBLEM — M11.20 CALCIUM PYROPHOSPHATE DEPOSITION DISEASE (CPPD): Status: ACTIVE | Noted: 2018-12-07

## 2024-12-20 PROBLEM — M15.9 GENERALIZED OSTEOARTHROSIS, INVOLVING MULTIPLE SITES: Status: ACTIVE | Noted: 2018-05-09

## 2024-12-20 PROBLEM — Z98.890 STATUS POST LUMBAR SURGERY: Status: ACTIVE | Noted: 2023-03-28

## 2024-12-20 PROBLEM — R07.89 CHEST DISCOMFORT: Status: ACTIVE | Noted: 2023-05-18

## 2024-12-20 PROBLEM — F33.41 MDD (MAJOR DEPRESSIVE DISORDER), RECURRENT, IN PARTIAL REMISSION (CMS-HCC): Status: ACTIVE | Noted: 2018-12-06

## 2024-12-20 PROBLEM — E78.00 PURE HYPERCHOLESTEROLEMIA: Status: ACTIVE | Noted: 2024-07-01

## 2024-12-20 PROBLEM — M54.50 CHRONIC LOW BACK PAIN: Status: ACTIVE | Noted: 2018-03-23

## 2024-12-20 PROBLEM — F17.200 NICOTINE USE DISORDER: Status: ACTIVE | Noted: 2021-12-08

## 2024-12-20 PROBLEM — I25.118 ATHEROSCLEROTIC HEART DISEASE OF NATIVE CORONARY ARTERY WITH OTHER FORMS OF ANGINA PECTORIS: Status: ACTIVE | Noted: 2021-12-10

## 2024-12-20 PROBLEM — R52 PAIN, UNSPECIFIED: Status: ACTIVE | Noted: 2024-12-20

## 2024-12-20 PROBLEM — G47.30 SLEEP APNEA: Status: ACTIVE | Noted: 2022-08-16

## 2024-12-20 PROBLEM — I21.4 NON-ST ELEVATION (NSTEMI) MYOCARDIAL INFARCTION (MULTI): Status: ACTIVE | Noted: 2021-12-06

## 2024-12-20 PROBLEM — E66.811 OBESITY, CLASS I, BMI 30-34.9: Status: ACTIVE | Noted: 2019-09-13

## 2024-12-20 PROBLEM — M46.1 BILATERAL SACROILIITIS (CMS-HCC): Status: ACTIVE | Noted: 2023-05-23

## 2024-12-20 PROBLEM — J44.9 COPD (CHRONIC OBSTRUCTIVE PULMONARY DISEASE) (MULTI): Status: ACTIVE | Noted: 2024-12-20

## 2024-12-20 PROBLEM — M50.30 DEGENERATION OF CERVICAL INTERVERTEBRAL DISC: Status: ACTIVE | Noted: 2018-05-09

## 2024-12-20 PROBLEM — Z86.73 HISTORY OF TIA (TRANSIENT ISCHEMIC ATTACK): Status: ACTIVE | Noted: 2024-10-10

## 2024-12-20 PROBLEM — M54.40 LUMBAGO WITH SCIATICA, UNSPECIFIED SIDE: Status: ACTIVE | Noted: 2018-10-02

## 2024-12-20 PROBLEM — M54.16 LUMBAR RADICULOPATHY: Status: ACTIVE | Noted: 2021-05-17

## 2024-12-20 PROBLEM — I10 ESSENTIAL (PRIMARY) HYPERTENSION: Status: ACTIVE | Noted: 2022-07-19

## 2024-12-20 PROBLEM — N40.0 BPH (BENIGN PROSTATIC HYPERPLASIA): Status: ACTIVE | Noted: 2018-05-09

## 2024-12-20 PROBLEM — G45.9 TIA (TRANSIENT ISCHEMIC ATTACK): Status: ACTIVE | Noted: 2018-06-03

## 2024-12-20 PROBLEM — K21.9 GASTROESOPHAGEAL REFLUX DISEASE: Status: ACTIVE | Noted: 2017-01-16

## 2024-12-20 PROBLEM — T88.59XA DELAYED EMERGENCE FROM ANESTHESIA: Status: ACTIVE | Noted: 2023-03-21

## 2024-12-20 PROBLEM — E78.5 HYPERLIPIDEMIA: Status: ACTIVE | Noted: 2021-07-15

## 2024-12-20 PROCEDURE — G2211 COMPLEX E/M VISIT ADD ON: HCPCS | Performed by: ANESTHESIOLOGY

## 2024-12-20 PROCEDURE — 1125F AMNT PAIN NOTED PAIN PRSNT: CPT | Performed by: ANESTHESIOLOGY

## 2024-12-20 PROCEDURE — 99214 OFFICE O/P EST MOD 30 MIN: CPT | Performed by: ANESTHESIOLOGY

## 2024-12-20 PROCEDURE — 1159F MED LIST DOCD IN RCRD: CPT | Performed by: ANESTHESIOLOGY

## 2024-12-20 ASSESSMENT — PATIENT HEALTH QUESTIONNAIRE - PHQ9
SUM OF ALL RESPONSES TO PHQ9 QUESTIONS 1 & 2: 2
2. FEELING DOWN, DEPRESSED OR HOPELESS: SEVERAL DAYS
1. LITTLE INTEREST OR PLEASURE IN DOING THINGS: SEVERAL DAYS
10. IF YOU CHECKED OFF ANY PROBLEMS, HOW DIFFICULT HAVE THESE PROBLEMS MADE IT FOR YOU TO DO YOUR WORK, TAKE CARE OF THINGS AT HOME, OR GET ALONG WITH OTHER PEOPLE: SOMEWHAT DIFFICULT

## 2024-12-20 ASSESSMENT — ENCOUNTER SYMPTOMS
ACTIVITY CHANGE: 1
ARTHRALGIAS: 1

## 2024-12-20 ASSESSMENT — PAIN - FUNCTIONAL ASSESSMENT: PAIN_FUNCTIONAL_ASSESSMENT: 0-10

## 2024-12-20 ASSESSMENT — PAIN SCALES - GENERAL
PAINLEVEL_OUTOF10: 8
PAINLEVEL_OUTOF10: 8

## 2024-12-20 NOTE — PROGRESS NOTES
The patient is a 69-year-old male with left hip and groin pain.  The pain is worse when he is standing and walking.  He gets some relief with rest.  His quality of life is unacceptable.  He recently had plain films of the left hip at the [facility].  He has severe osteoarthritic changes.  He underwent a bursa injection which was ineffective.  The patient will be undergoing an intra-articular hip injection on Monday.  The patient benefited from a right intra-articular hip injection that I did for him earlier this year.    Review of Systems   Constitutional:  Positive for activity change.   Musculoskeletal:  Positive for arthralgias and gait problem.   All other systems reviewed and are negative.    GENERAL: alert and appropriate, in no distress, well-hydrated, well-nourished and happy, smiling, interactive  SKIN: no rash noted  RESPIRATORY: breathing non-labored and no grunting/flaring/retractions  CHEST: equal chest rise with normal respiratory effort  ABDOMEN: soft and non-tender  BACK: back normal in appearance, spine with reduced ROM  EXTREMITIES: strength intact, BIRD reproduced pain on the left  NEUROLOGIC: patient seated, sensation grossly intact.      Assessment and Plan    -Chronicity--chronic pain    -Diagnostics--no new imaging    -Pharmacologic--no change    -Psychologic--no need for psychologic intervention from my standpoint.  There are no mental health issues of which I am aware that are contributing to the patient's pain.  There are no substance abuse or alcohol abuse issues of which I am aware that are contributing to the patient's pain.    -Physical--we discussed the importance of physical therapy and exercise.  We discussed avoidance and modification techniques.    -Intervention--no intervention planned    I spent time educating the patient on the condition including the treatment and the prognosis.  I invited the patient to call at anytime with any questions.

## 2024-12-23 ENCOUNTER — APPOINTMENT (OUTPATIENT)
Dept: PAIN MEDICINE | Facility: CLINIC | Age: 69
End: 2024-12-23
Payer: MEDICARE

## 2025-01-02 NOTE — PROGRESS NOTES
Reason for Visit: ED possible consideration for angioplasty    Referred: Self referred     History of Present Illness  Reji Simons is a 69 y.o. year old male patient with h/o CAD and MI x2 PCI x3 last 7/2023, OA left hip, degenerative disc disease with chronic back pain, multiple lumbar surgery with hardware, chronically take prednisone daily for history of pseudogout, current daily smoker (2PPD >30 years), CALLIE L >R and ED. Pt presents today in a wheelchair reporting h/o many years of ED that is refractory to PDE5 inhibitors. Pt was seen by urology Dr. Ordonez 1/6/25 and had Penile duplex doppler US which showed right mix severe arterial and venous disease and left with severe arterial insufficiency w/o venous leakage. Pt was referred to us for possible consideration of angioplasty.       Past Medical History  Past Medical History:   Diagnosis Date    Lumbar pain        Past Surgical History  No past surgical history on file.    Social History  Social History     Tobacco Use    Smoking status: Every Day     Current packs/day: 2.00     Average packs/day: 2.0 packs/day for 32.0 years (64.1 ttl pk-yrs)     Types: Cigarettes     Start date: 1/1/1993    Smokeless tobacco: Never   Vaping Use    Vaping status: Never Used   Substance Use Topics    Alcohol use: Yes     Alcohol/week: 4.0 standard drinks of alcohol     Types: 4 Shots of liquor per week     Comment: 4 drinks on occasion    Drug use: Not Currently       Family History     Family History   Problem Relation Name Age of Onset    Diabetes Mother      Stroke Mother      Prostate cancer Father         Review of Systems  As per HPI, all other systems reviewed and negative.    Allergies:  Allergies   Allergen Reactions    Titanium Unknown    Varenicline Unknown     Bad dreams, nausea        Outpatient Medications:  Current Outpatient Medications   Medication Instructions    alendronate (Fosamax) 35 mg tablet 1 tablet 30 minutes before the first food, beverage  or medicine of the day with plain water Orally for 30 day(s)    aspirin (ASPIR-LOW ORAL) 81 MG   - 1 tablet Orally Once a day for 30 day(s)    carvedilol (COREG) 3.125 mg, 2 times daily (morning and late afternoon)    citalopram (CeleXA) 10 mg tablet 1 tablet Orally Once a day for 30 day(s)    gabapentin (Neurontin) 300 mg capsule 1 capsule, 3 times daily    nitroglycerin (Nitrostat) 0.4 mg SL tablet as directed Sublingual    oxyCODONE-acetaminophen (Percocet) 7.5-325 mg tablet 1 tablet, oral, Every 6 hours PRN    pantoprazole (PROTONIX) 40 mg, 2 times daily    predniSONE (DELTASONE) 5 mg, Daily    tadalafil (Cialis) 20 mg tablet 0.5 tablets, As needed    testosterone cypionate (Depo-Testosterone) 200 mg/mL injection 1 ml Intramuscular         Vitals:  Vitals:    01/07/25 1416   BP: 128/82   Pulse: (!) 111       Physical Exam:  Constitutional: obese male, overpowering odor of cigarette smoke, NAD, pleasant, cooperative, in wheelchair    Head/Neck: Neck supple, No JVD, no carotid bruits           Respiratory/Thorax: noticeable taking deep breaths every few seconds during conversation     Cardiovascular: Regular, rate and rhythm, no murmurs      Skin: Warm and dry              Extremities: No edema, no discoloration, no open sores, distal pulses palpable bilaterally  Neuro: non-focal, awake/alert/oriented x3,   Psychological: inappropriate behavior        Reviewed Study(s):  Vascular Studies    Penile duplex Doppler ultrasound:                Right                              Left   Cavernosal artery diameters:               0.61 mm                                       0.85 mm   Peak systolic velocities:                       19.35 cm/sec                           11.53 cm/sec      End diastolic velocities:                       8.42 cm/sec                             3.65 cm/sec      Impression: Severe erectile dysfunction secondary to arterial insufficiency and corporo veno-occlusive dysfunction refractory to  medical management with PDE's (Viagra, Cialis) and intracavernosal therapy      Assessment/Plan   Reji Simons is a 69 y.o. year old male patient with h/o CAD and MI x2 PCI x3 last 7/2023, OA left hip, degenerative disc disease with chronic back pain, multiple lumbar surgery with hardware, chronically take prednisone daily for history of pseudogout, current daily smoker (30 years hx) and ED. Pt reports many years of ED that is refractory to PDE5 inhibitors. Pt was seen by urology Dr. Ordonez 1/6/25 and had Penile duplex doppler US which showed right mix disease and left with severe arterial insufficiency w/o venous leakage.     Erectile Dysfunction    - Discuss the result of the US with the patient in detail. The right side has a mixed arterial and venous component, Left side mostly severe arterial insufficiency w/o venous leakage. There might be an opportunity to fix the left side, however patient lifestyle has significant contributory to noted vascular disease and if he continue to smoke the angioplasty procedure will be effective.   - Pt is a heavy smoker, had a long discussion with the patient about the harmful effects of smoking and the importance of cessation. Pt was offered referral to  smoking cessation clinic but declined stating he has tried in the past and did not work for him and is not interested at this time.  - Pt educated changes in lifestyle can significantly improve his sexual and overall health.  - Advised aggressive risk factor modifications with heart healthy diet, physical exercise and weight loss.   - Advised to FU with his current cardiologist and pulmonologist at [facility] as scheduled for cardiac and lung workup.  - Advised FU with orthopedics for left hip OA which is preventing him from walking and engaging in physical exercise.   - continue taking Aspirin and Repatha   - Once Patient have completed the above recommendations he can call for possible angioplasty of the pudendal artery.      Ruddy Browne, DO

## 2025-01-06 ENCOUNTER — PROCEDURE VISIT (OUTPATIENT)
Dept: UROLOGY | Facility: HOSPITAL | Age: 70
End: 2025-01-06
Payer: MEDICARE

## 2025-01-06 VITALS — DIASTOLIC BLOOD PRESSURE: 84 MMHG | SYSTOLIC BLOOD PRESSURE: 138 MMHG

## 2025-01-06 DIAGNOSIS — N48.6 PEYRONIE DISEASE: ICD-10-CM

## 2025-01-06 DIAGNOSIS — N52.01 ERECTILE DYSFUNCTION DUE TO ARTERIAL INSUFFICIENCY: Primary | ICD-10-CM

## 2025-01-06 PROCEDURE — 99215 OFFICE O/P EST HI 40 MIN: CPT | Mod: 25 | Performed by: UROLOGY

## 2025-01-06 PROCEDURE — 54235 NJX CORPORA CAVERNOSA RX AGT: CPT | Performed by: UROLOGY

## 2025-01-06 PROCEDURE — 93980 PENILE VASCULAR STUDY: CPT | Performed by: UROLOGY

## 2025-01-06 PROCEDURE — 99215 OFFICE O/P EST HI 40 MIN: CPT | Performed by: UROLOGY

## 2025-01-06 ASSESSMENT — PATIENT HEALTH QUESTIONNAIRE - PHQ9
1. LITTLE INTEREST OR PLEASURE IN DOING THINGS: NOT AT ALL
2. FEELING DOWN, DEPRESSED OR HOPELESS: NOT AT ALL
SUM OF ALL RESPONSES TO PHQ9 QUESTIONS 1 AND 2: 0

## 2025-01-06 NOTE — PROGRESS NOTES
Procedure:  Intracavernosal injection   Penile ultrasound/gray scale   Penile duplex Doppler ultrasound     Indication: Erectile dysfunction refractory to PDE5 inhibitors   Penile Ultrasound: Morphologic and gray scale evaluation of the penis     1.- Tunical integrity:    Peyronie’s plaque with calcifications      2.- Vascular integrity:     Grade 2: smooth vessel wall, but with short interruptions     3.- Erectile tissue integrity:   Fibrotic; patchy hypoechoic areas or coalesced central hypoechoic defect     Medication:     10 units of alprostadil     Penile rigidity:  30 %   Penile curvature: none   Phenylephrine: none  ---? Pt left before being evaluated for priapism, discussed risks of this in dtail    Penile duplex Doppler ultrasound:             Right   Left   Cavernosal artery diameters:     0.61 mm                   0.85 mm   Peak systolic velocities:  19.35 cm/sec   11.53 cm/sec    End diastolic velocities:  8.42 cm/sec   3.65 cm/sec       Impression: Severe erectile dysfunction secondary to arterial insufficiency and corporo veno-occlusive dysfunction refractory to medical management with PDE's (Viagra, Cialis) and intracavernosal therapy     Plan:  Fu with Dr. Jeronimo   Penile prosthesis, the risks and benefits were explained in detail and educational material was provided.        The patient was also counseled extensively that today or while on injections If he develops a priapism / erection over 4 hours he was ask to return to the office or to the emergency room immediately. Educational material was provided and all his questions and concerns were addressed.

## 2025-01-06 NOTE — PROGRESS NOTES
HPI:  69 y.o. yo male patient complains of  erectile dysfunction    Last Visit: 11/26/24  #Erectile Dysfunction:   - failed pills and injection  -Doppler in 2020 with Amaury Dyson--arterial insufficiency   - will refer him to Dr Duncan in cardiology for angiogram/potential stent  -may contact us to proceed with further discuss IPP if stenting not an option    #Peyronies disease   - has had Doppler in 2020 showing arterial insufficiency as well, likely needs IPP    Today's Visit     ED, PD, Low T  -Doppler completed today 10 of alprostadil ( see separate note)  -failed ICI and cialis 10 daily (LUTS)  -PD, Hourglass shaped  -has already had xiaflex but didn't help him  -on IM T  -cardiac and pulm hx  -previously offered implant-declined at   -declined plication at   - had Doppler in 2020 with Amaury Dyson showing arterial insufficiency  Repeat doppler done today      s/p prostatectomy:   On nitrates/NTG?: No  Have been on Testosterone /anabolic steroids? On T        Lab Results   Component Value Date    HGBA1C 6.1 (H) 07/09/2023     CBC, CMP reviewed    PMH:  Past Medical History:   Diagnosis Date    Lumbar pain         PSH:  No past surgical history on file.     Medications:    Current Outpatient Medications:     alendronate (Fosamax) 35 mg tablet, 1 tablet 30 minutes before the first food, beverage or medicine of the day with plain water Orally for 30 day(s), Disp: , Rfl:     aspirin (ASPIR-LOW ORAL), 81 MG  - 1 tablet Orally Once a day for 30 day(s), Disp: , Rfl:     carvedilol (Coreg) 3.125 mg tablet, Take 1 tablet (3.125 mg) by mouth 2 times daily (morning and late afternoon)., Disp: , Rfl:     citalopram (CeleXA) 10 mg tablet, 1 tablet Orally Once a day for 30 day(s), Disp: , Rfl:     clopidogrel (Plavix) 75 mg tablet, Take 1 tablet (75 mg) by mouth once daily., Disp: , Rfl:     gabapentin (Neurontin) 300 mg capsule, Take 1 capsule (300 mg) by mouth 3 times a day., Disp: , Rfl:     nitroglycerin (Nitrostat)  0.4 mg SL tablet, as directed Sublingual, Disp: , Rfl:     oxyCODONE-acetaminophen (Percocet) 7.5-325 mg tablet, Take 1 tablet by mouth every 6 hours if needed for severe pain (7 - 10)., Disp: 120 tablet, Rfl: 0    pantoprazole (ProtoNix) 40 mg EC tablet, Take 1 tablet (40 mg) by mouth 2 times a day., Disp: , Rfl:     predniSONE (Deltasone) 5 mg tablet, Take 1 tablet (5 mg) by mouth once daily., Disp: , Rfl:     tadalafil (Cialis) 20 mg tablet, Take 0.5 tablets (10 mg) by mouth if needed., Disp: , Rfl:     testosterone cypionate (Depo-Testosterone) 200 mg/mL injection, 1 ml Intramuscular, Disp: , Rfl:     Allergy:  Allergies   Allergen Reactions    Titanium Unknown    Varenicline Unknown     Bad dreams, nausea        Exam  Testicles descended bilaterally, nontender, no masses  Vasa palpable bilaterally  Penis circ'd, no lesions, no plaques  Peyronie's plaque dorsally    Assessment/Plan  #Erectile Dysfunction failed pills and injections:     -Doppler completed today with poor response   -Doppler in 2020 with Amaury Dyson--arterial insufficiency     We had a long discussion regarding penile prosthesis, including risks, benefits, and alternatives. We discussed risks including but not limited to pain, bleeding, infection, damage to adjacent structures, need for further procedures, malfunction, erosion, extrusion, complications of anesthesia etc. We discussed the postoperative course and expectations, and specifically that after getting an implant, spontaneous erections do not occur other methods such as injections etc are no longer effective. We also discussed the effect of the implant on length and that it will likely be shorter than previous given age and duration of ED. Further, if he gets an infection or the implant has to be removed for any reason, there is potential for him to never have erections again. All questions were answered and reading materials were given. The patient was given models of both the  inflatable and malleable implants, and his ability to squeeze the pump was also assessed.     -patient was told to wait for 30 minutes for reduction of erection after Doppler- he declined waiting against medical advice pt verbalizes understanding of risks associated with priapism and the need to present to ER if erection lasts for more than 4 hours  -will see Dr. Roberto    #PD  -reviewd treatment options agaoin today    Pt will fu with me after if interested in implant    Scribe Attestation  By signing my name below, IJessica Scribe   attest that this documentation has been prepared under the direction and in the presence of Oneal Ordonez MD.

## 2025-01-07 ENCOUNTER — APPOINTMENT (OUTPATIENT)
Dept: CARDIOLOGY | Facility: CLINIC | Age: 70
End: 2025-01-07
Payer: MEDICARE

## 2025-01-07 VITALS
SYSTOLIC BLOOD PRESSURE: 128 MMHG | DIASTOLIC BLOOD PRESSURE: 82 MMHG | HEIGHT: 68 IN | BODY MASS INDEX: 33.45 KG/M2 | HEART RATE: 111 BPM

## 2025-01-07 DIAGNOSIS — I65.29 CAROTID ARTERY STENOSIS: ICD-10-CM

## 2025-01-07 DIAGNOSIS — N52.9 IMPOTENCE OF ORGANIC ORIGIN: ICD-10-CM

## 2025-01-07 DIAGNOSIS — Z86.73 HISTORY OF TIA (TRANSIENT ISCHEMIC ATTACK): ICD-10-CM

## 2025-01-07 DIAGNOSIS — N52.9 ED (ERECTILE DYSFUNCTION): Primary | ICD-10-CM

## 2025-01-07 DIAGNOSIS — I25.10 CAD IN NATIVE ARTERY: ICD-10-CM

## 2025-01-07 DIAGNOSIS — I21.4 NON-ST ELEVATION (NSTEMI) MYOCARDIAL INFARCTION (MULTI): ICD-10-CM

## 2025-01-07 PROCEDURE — 3074F SYST BP LT 130 MM HG: CPT | Performed by: INTERNAL MEDICINE

## 2025-01-07 PROCEDURE — 99205 OFFICE O/P NEW HI 60 MIN: CPT | Performed by: INTERNAL MEDICINE

## 2025-01-07 PROCEDURE — 3079F DIAST BP 80-89 MM HG: CPT | Performed by: INTERNAL MEDICINE

## 2025-01-07 PROCEDURE — 1126F AMNT PAIN NOTED NONE PRSNT: CPT | Performed by: INTERNAL MEDICINE

## 2025-01-07 PROCEDURE — 1160F RVW MEDS BY RX/DR IN RCRD: CPT | Performed by: INTERNAL MEDICINE

## 2025-01-07 PROCEDURE — 1159F MED LIST DOCD IN RCRD: CPT | Performed by: INTERNAL MEDICINE

## 2025-01-07 ASSESSMENT — PAIN SCALES - GENERAL: PAINLEVEL_OUTOF10: 0-NO PAIN

## 2025-01-07 NOTE — PATIENT INSTRUCTIONS
Thank you for coming to see us in the [facility] today.      We have reviewed vascular testing done at urology office. You have a mixed disease of the artery and venous system. The artery is something we are able to fix, however lifestyle changes is something like stopping smoking is very important. The right side unlikely we can do very much to fix but on the left side there is an opportunity to improve.  - First we recommend you follow up Cardiology and pulmonary disease.  - Please consider quitting smoking, as it affects the arteries all over your body and can cause lung cancer, stroke or heart attack. This is also very important in improving your sexual health and prevent the arteries from closing.  - continue taking Aspirin and Repatha   - Once you have completed the above can give us a call for possible angioplasty of the pudendal artery.     If you have any questions or concerns please give us a call at  option 1

## 2025-01-14 DIAGNOSIS — M96.1 LUMBAR POST-LAMINECTOMY SYNDROME: ICD-10-CM

## 2025-01-14 RX ORDER — OXYCODONE AND ACETAMINOPHEN 7.5; 325 MG/1; MG/1
1 TABLET ORAL EVERY 6 HOURS PRN
Qty: 120 TABLET | Refills: 0 | Status: SHIPPED | OUTPATIENT
Start: 2025-01-14 | End: 2025-01-17

## 2025-01-20 DIAGNOSIS — M96.1 LUMBAR POST-LAMINECTOMY SYNDROME: ICD-10-CM

## 2025-01-20 RX ORDER — OXYCODONE AND ACETAMINOPHEN 7.5; 325 MG/1; MG/1
1 TABLET ORAL EVERY 6 HOURS PRN
Qty: 120 TABLET | Refills: 0 | Status: SHIPPED | OUTPATIENT
Start: 2025-01-20 | End: 2025-02-19

## 2025-01-30 ENCOUNTER — PREP FOR PROCEDURE (OUTPATIENT)
Dept: RADIOLOGY | Facility: EXTERNAL LOCATION | Age: 70
End: 2025-01-30
Payer: MEDICARE

## 2025-01-30 ENCOUNTER — TELEPHONE (OUTPATIENT)
Dept: PAIN MEDICINE | Facility: CLINIC | Age: 70
End: 2025-01-30
Payer: MEDICARE

## 2025-01-30 DIAGNOSIS — M96.1 POSTLAMINECTOMY SYNDROME, LUMBAR REGION: Primary | ICD-10-CM

## 2025-02-04 ENCOUNTER — HOSPITAL ENCOUNTER (OUTPATIENT)
Dept: OPERATING ROOM | Facility: HOSPITAL | Age: 70
Setting detail: OUTPATIENT SURGERY
Discharge: HOME | End: 2025-02-04
Payer: MEDICARE

## 2025-02-04 VITALS
HEIGHT: 68 IN | HEART RATE: 88 BPM | RESPIRATION RATE: 18 BRPM | DIASTOLIC BLOOD PRESSURE: 67 MMHG | TEMPERATURE: 96.4 F | OXYGEN SATURATION: 95 % | WEIGHT: 220 LBS | BODY MASS INDEX: 33.34 KG/M2 | SYSTOLIC BLOOD PRESSURE: 100 MMHG

## 2025-02-04 DIAGNOSIS — M96.1 POSTLAMINECTOMY SYNDROME, LUMBAR REGION: ICD-10-CM

## 2025-02-04 PROCEDURE — 2500000004 HC RX 250 GENERAL PHARMACY W/ HCPCS (ALT 636 FOR OP/ED): Performed by: ANESTHESIOLOGY

## 2025-02-04 PROCEDURE — 7100000009 HC PHASE TWO TIME - INITIAL BASE CHARGE

## 2025-02-04 PROCEDURE — 62323 NJX INTERLAMINAR LMBR/SAC: CPT | Performed by: ANESTHESIOLOGY

## 2025-02-04 PROCEDURE — 3600000001 HC OR TIME - INITIAL BASE CHARGE - PROCEDURE LEVEL ONE

## 2025-02-04 PROCEDURE — 3600000006 HC OR TIME - EACH INCREMENTAL 1 MINUTE - PROCEDURE LEVEL ONE

## 2025-02-04 PROCEDURE — 2550000001 HC RX 255 CONTRASTS: Performed by: ANESTHESIOLOGY

## 2025-02-04 PROCEDURE — 7100000010 HC PHASE TWO TIME - EACH INCREMENTAL 1 MINUTE

## 2025-02-04 PROCEDURE — 2500000005 HC RX 250 GENERAL PHARMACY W/O HCPCS: Performed by: ANESTHESIOLOGY

## 2025-02-04 RX ORDER — SODIUM CHLORIDE 9 MG/ML
INJECTION, SOLUTION INTRAMUSCULAR; INTRAVENOUS; SUBCUTANEOUS AS NEEDED
Status: COMPLETED | OUTPATIENT
Start: 2025-02-04 | End: 2025-02-04

## 2025-02-04 RX ORDER — LIDOCAINE HYDROCHLORIDE 5 MG/ML
INJECTION, SOLUTION INFILTRATION; INTRAVENOUS AS NEEDED
Status: COMPLETED | OUTPATIENT
Start: 2025-02-04 | End: 2025-02-04

## 2025-02-04 RX ORDER — TRIAMCINOLONE ACETONIDE 40 MG/ML
INJECTION, SUSPENSION INTRA-ARTICULAR; INTRAMUSCULAR AS NEEDED
Status: COMPLETED | OUTPATIENT
Start: 2025-02-04 | End: 2025-02-04

## 2025-02-04 RX ADMIN — IOHEXOL 1 ML: 240 INJECTION, SOLUTION INTRATHECAL; INTRAVASCULAR; INTRAVENOUS; ORAL at 09:17

## 2025-02-04 RX ADMIN — LIDOCAINE HYDROCHLORIDE 10 ML: 5 INJECTION, SOLUTION INFILTRATION at 09:16

## 2025-02-04 RX ADMIN — SODIUM CHLORIDE 4 ML: 9 INJECTION INTRAMUSCULAR; INTRAVENOUS; SUBCUTANEOUS at 09:17

## 2025-02-04 RX ADMIN — TRIAMCINOLONE ACETONIDE 60 MG: 40 INJECTION, SUSPENSION INTRA-ARTICULAR; INTRAMUSCULAR at 09:17

## 2025-02-04 ASSESSMENT — PAIN DESCRIPTION - DESCRIPTORS: DESCRIPTORS: ACHING;DULL;SHARP

## 2025-02-04 ASSESSMENT — PAIN SCALES - GENERAL
PAINLEVEL_OUTOF10: 7
PAINLEVEL_OUTOF10: 8

## 2025-02-04 ASSESSMENT — ENCOUNTER SYMPTOMS
LOSS OF SENSATION IN FEET: 0
OCCASIONAL FEELINGS OF UNSTEADINESS: 1
DEPRESSION: 0

## 2025-02-04 ASSESSMENT — PAIN - FUNCTIONAL ASSESSMENT
PAIN_FUNCTIONAL_ASSESSMENT: 0-10
PAIN_FUNCTIONAL_ASSESSMENT: 0-10

## 2025-02-04 NOTE — H&P
"History Of Present Illness  Reji Simons is a 69 y.o. male presenting with low back and bilateral leg pain.     Past Medical History  Past Medical History:   Diagnosis Date    Lumbar pain        Surgical History  History reviewed. No pertinent surgical history.     Social History  He reports that he has been smoking cigarettes. He started smoking about 32 years ago. He has a 64.2 pack-year smoking history. He has never used smokeless tobacco. He reports current alcohol use of about 4.0 standard drinks of alcohol per week. He reports that he does not currently use drugs.    Family History  Family History   Problem Relation Name Age of Onset    Diabetes Mother      Stroke Mother      Prostate cancer Father          Allergies  Titanium and Varenicline    Review of Systems     Physical Exam     Last Recorded Vitals  Blood pressure 109/53, pulse 94, temperature 35.8 °C (96.4 °F), temperature source Temporal, resp. rate 20, height 1.727 m (5' 8\"), weight 99.8 kg (220 lb), SpO2 96%.    Relevant Results             Assessment/Plan   Assessment & Plan  Postlaminectomy syndrome, lumbar region      Caudal epidural steroid injection       I spent 10 minutes in the professional and overall care of this patient.      Mika Damon MD    "

## 2025-02-04 NOTE — DISCHARGE INSTRUCTIONS
You underwent a procedure today    After most procedures, it is recommended that you relax and limit your activity for the remainder of the day unless you have been told otherwise by your pain physician.  You should not drive a car, operate machinery, or make important legal decisions unless otherwise directed by your pain physician.  You may resume your normal activity, including exercise, tomorrow.      Keep a written pain diary of how much pain relief you experienced following the procedure and the length of time of pain relief you experienced pain relief. Following diagnostic injections like medial branch nerve blocks, sacroiliac joint blocks, stellate ganglion injections and other blocks, it is very important you record the specific amount of pain relief you experienced immediately after the injectionand how long it lasted. Your doctor will ask you for this information at your follow up visit.     For all injections, please keep the injection site dry and inspect the site for a couple of days. You may remove the Band-Aid the day of the injection at any time.     Some discomfort, bruising or slight swelling may occur at the injection site. This is not abnormal if it occurs.  If needed you may:    -Take over the counter medication such as Tylenol or Motrin.   -Apply an ice pack for 30 minutes, 2 to 3 times a day for the first 24 hours.     You may shower today; no soaking baths, hot tubs, whirlpools or swimming pools for two days.      If you are given steroids in your injection, it may take 3-5 days for the steroid medication to take effect. You may notice a worsening of your symptoms for 1-2 days after the injection. This is not abnormal.  You may use acetaminophen, ibuprofen, or prescription medication that your doctor may have prescribed for you if you need to do so.     A few common side effects of steroids include facial flushing, sweating, restlessness, irritability,difficulty sleeping, increase in blood  sugar, and increased blood pressure. If you have diabetes, please monitor your blood sugar at least once a day for at least 5 days. If you have poorly controlled high blood pressure, monitoryour blood pressure for at least 2 days and contact your primary care physician if these numbers are unusually high for you.      If you take aspirin or non-steroidal anti-inflammatory drugs (examples are Motrin, Advil, ibuprofen, Naprosyn, Voltaren, Relafen, etc.) you may restart these this evening.    You do not need to discontinue non-aspirin-containing pain medications prior to an injection (examples: Celebrex, tramadol, hydrocodone and acetaminophen).      If you take a blood thinning medication (Coumadin, Lovenox, Fragmin,Ticlid, Plavix, Pradaxa, etc.), please discuss this with your primary care physician/cardiologist and your pain physician. These medications MUST be discontinued before you can have an injection safely, without the risk of uncontrolled bleeding. If these medications are not discontinued for an appropriate period of time, you will not be able to receivean injection.      If you are taking Coumadin, please have your INR checked the morning of your procedure and bringthe result to your appointment unless otherwise instructed. If your INR is over 1.2, your injection may need to be rescheduled to avoid uncontrolled bleeding from the needle placement.     Call [facility] at [phone] between 8am-4pm Monday - Friday if you are experiencing the following:    If you received an epidural or spinal injection:    -Headache that doesnot go away with medicine, is worse when sitting or standing up, and is greatly relieved upon lying down.   -Severe pain worse than or different than your baseline pain.   -Chills or fever (101º F or greater).   -Drainage or signs of infection at the injection site     Go directly to the Emergency Department if you are experiencing the following and received an  epidural or spinal injection:   -Abrupt weakness or progressive weakness in your legs that starts after you leave the clinic.   -Abrupt severe or worsening numbness in your legs.   -Inability to urinate after the injection or loss of bowel or bladder control without the urge to defecate or urinate.     If you have a clinical question that cannot wait until your next appointment, please call [phone] between 8am-4pm Monday - Friday or send a [email or web address] message. We do our best to return all non-emergency messages within 24 hours, Monday - Friday. A nurse or physician will return your message.      If you need to cancel an appointment, please call the scheduling staff at [phone] during normal business hours or leave a message at least 24 hours in advance.     If you are going to be sedated for your next procedure, you MUST have responsible adult who can legally drive accompany you home. You cannot eat or drink for eight hours prior to the planned procedure if you are going to receive sedation. You may take your non-blood thinning medications with a small sip of water.

## 2025-02-20 ENCOUNTER — APPOINTMENT (OUTPATIENT)
Dept: PAIN MEDICINE | Facility: CLINIC | Age: 70
End: 2025-02-20
Payer: MEDICARE

## 2025-02-21 ENCOUNTER — OFFICE VISIT (OUTPATIENT)
Dept: PAIN MEDICINE | Facility: CLINIC | Age: 70
End: 2025-02-21
Payer: MEDICARE

## 2025-02-21 VITALS
HEART RATE: 80 BPM | OXYGEN SATURATION: 99 % | BODY MASS INDEX: 32.28 KG/M2 | HEIGHT: 68 IN | WEIGHT: 213 LBS | DIASTOLIC BLOOD PRESSURE: 60 MMHG | RESPIRATION RATE: 24 BRPM | SYSTOLIC BLOOD PRESSURE: 110 MMHG

## 2025-02-21 DIAGNOSIS — Z79.891 ENCOUNTER FOR LONG-TERM OPIATE ANALGESIC USE: ICD-10-CM

## 2025-02-21 DIAGNOSIS — M96.1 POSTLAMINECTOMY SYNDROME, LUMBAR REGION: Primary | ICD-10-CM

## 2025-02-21 DIAGNOSIS — M96.1 LUMBAR POST-LAMINECTOMY SYNDROME: ICD-10-CM

## 2025-02-21 PROCEDURE — 99215 OFFICE O/P EST HI 40 MIN: CPT | Performed by: ANESTHESIOLOGY

## 2025-02-21 PROCEDURE — 3078F DIAST BP <80 MM HG: CPT | Performed by: ANESTHESIOLOGY

## 2025-02-21 PROCEDURE — 1159F MED LIST DOCD IN RCRD: CPT | Performed by: ANESTHESIOLOGY

## 2025-02-21 PROCEDURE — 3074F SYST BP LT 130 MM HG: CPT | Performed by: ANESTHESIOLOGY

## 2025-02-21 PROCEDURE — G2211 COMPLEX E/M VISIT ADD ON: HCPCS | Performed by: ANESTHESIOLOGY

## 2025-02-21 PROCEDURE — 1125F AMNT PAIN NOTED PAIN PRSNT: CPT | Performed by: ANESTHESIOLOGY

## 2025-02-21 PROCEDURE — 3008F BODY MASS INDEX DOCD: CPT | Performed by: ANESTHESIOLOGY

## 2025-02-21 RX ORDER — OXYCODONE AND ACETAMINOPHEN 7.5; 325 MG/1; MG/1
1 TABLET ORAL EVERY 6 HOURS PRN
Qty: 120 TABLET | Refills: 0 | Status: SHIPPED | OUTPATIENT
Start: 2025-02-21 | End: 2025-03-23

## 2025-02-21 ASSESSMENT — PATIENT HEALTH QUESTIONNAIRE - PHQ9
1. LITTLE INTEREST OR PLEASURE IN DOING THINGS: SEVERAL DAYS
SUM OF ALL RESPONSES TO PHQ9 QUESTIONS 1 & 2: 2
10. IF YOU CHECKED OFF ANY PROBLEMS, HOW DIFFICULT HAVE THESE PROBLEMS MADE IT FOR YOU TO DO YOUR WORK, TAKE CARE OF THINGS AT HOME, OR GET ALONG WITH OTHER PEOPLE: SOMEWHAT DIFFICULT
2. FEELING DOWN, DEPRESSED OR HOPELESS: SEVERAL DAYS

## 2025-02-21 ASSESSMENT — ENCOUNTER SYMPTOMS
ACTIVITY CHANGE: 1
BACK PAIN: 1

## 2025-02-21 ASSESSMENT — PAIN SCALES - GENERAL
PAINLEVEL_OUTOF10: 7
PAINLEVEL_OUTOF10: 7

## 2025-02-21 ASSESSMENT — PAIN - FUNCTIONAL ASSESSMENT: PAIN_FUNCTIONAL_ASSESSMENT: 0-10

## 2025-02-21 NOTE — PROGRESS NOTES
The patient is a 69-year-old male with low back pain that persists after his fusion surgery as well as right hip pain.  The patient is in need of total hip arthroplasty.  In the meantime, the patient gets some relief the use of oxycodone without side effects.    Review of Systems   Constitutional:  Positive for activity change.   Musculoskeletal:  Positive for back pain and gait problem.   All other systems reviewed and are negative.    GENERAL: alert and appropriate, in no distress, well-hydrated, well-nourished and interactive  SKIN: no rash noted, surgical scars well healed  RESPIRATORY: breathing non-labored and no grunting/flaring/retractions  CHEST: equal chest rise with normal respiratory effort  ABDOMEN: soft and non-tender  BACK: back normal in appearance, spine with reduced ROM  EXTREMITIES: strength intact, BIRD reproduced pain on the right  NEUROLOGIC: gait antalgic, SLR negative, sensation grossly intact.    Assessment and Plan    -Chronicity--chronic musculoskeletal pain    -Diagnostics--no new imaging ordered    -Pharmacologic--refill oxycodone.  An opioid agreement was reviewed and signed with the patient. A copy was given to the patient. [place] was reviewed and was consistent with the history. A urine or saliva specimen was obtained for toxicology screening. The patient and I discussed the nature of this medication and its side effects. We discussed tolerance, physical dependence, psychological dependence, addiction and opioid-induced hyperalgesia. We discussed the potential need to wean from this medication. We discussed the availability of programs that can help with this process if necessary. We discussed safety issues related to opioids including safe storage. We discussed the fact that the patient should not drive an automobile or operate heavy machinery while taking this medication. A prescription for naloxone was offered to the patient. The patient will be reevaluated for the need to continue  opioid therapy in 30-90 days.    -Psychologic--no need for psychologic intervention from my standpoint.  There are no mental health issues of which I am aware that are contributing to the patient's pain.  There are no substance abuse or alcohol abuse issues of which I am aware that are contributing to the patient's pain.    -Physical--we discussed the importance of physical therapy and exercise.  We discussed avoidance and modification techniques.    -Intervention--no intervention planned at this time    I spent time educating the patient on the condition including the treatment and the prognosis.  I invited the patient to call at anytime with any questions.  Greater than 40 minutes was spent on patient care with more than half that time spent counseling the patient.

## 2025-02-25 LAB
6MAM SAL QL SCN: NEGATIVE NG/ML
AMPHETAMINES SAL QL SCN: NEGATIVE NG/ML
BARBITURATES SAL QL SCN: NEGATIVE NG/ML
BENZODIAZ SAL QL SCN: NEGATIVE NG/ML
BUPRENORPHINE SAL QL SCN: NEGATIVE NG/ML
CANNABINOIDS SAL QL SCN: NEGATIVE NG/ML
COCAINE SAL QL SCN: NEGATIVE NG/ML
CODEINE SAL CFM-MCNC: NEGATIVE NG/ML
COTININE SAL CFM-MCNC: 112.1 NG/ML
COTININE SAL QL SCN: POSITIVE NG/ML
DHC SAL CFM-MCNC: NEGATIVE NG/ML
FENTANYL SAL QL SCN: NEGATIVE NG/ML
HYDROCODONE SAL CFM-MCNC: NEGATIVE NG/ML
HYDROMORPHONE SAL CFM-MCNC: NEGATIVE NG/ML
MDMA SAL QL SCN: NEGATIVE NG/ML
MEPROBAMATE SAL QL SCN: NEGATIVE NG/ML
METHADONE SAL QL SCN: NEGATIVE NG/ML
MORPHINE SAL CFM-MCNC: NEGATIVE NG/ML
NORHYDROCODONE SAL CFM-MCNC: NEGATIVE NG/ML
NOROXYCODONE SAL CFM-MCNC: 35.2 NG/ML
OPIATES SAL QL SCN: POSITIVE NG/ML
OXYCODONE SAL CFM-MCNC: 129.5 NG/ML
OXYMORPHONE SAL CFM-MCNC: NEGATIVE NG/ML
PCP SAL QL SCN: NEGATIVE NG/ML
TAPENTADOL SAL QL SCN: NEGATIVE NG/ML
TRAMADOL SAL QL SCN: NEGATIVE NG/ML
ZOLPIDEM SAL QL SCN: NEGATIVE NG/ML

## 2025-03-19 ENCOUNTER — TELEMEDICINE (OUTPATIENT)
Dept: PAIN MEDICINE | Facility: CLINIC | Age: 70
End: 2025-03-19
Payer: MEDICARE

## 2025-03-19 DIAGNOSIS — M96.1 LUMBAR POST-LAMINECTOMY SYNDROME: ICD-10-CM

## 2025-03-19 PROCEDURE — 99214 OFFICE O/P EST MOD 30 MIN: CPT | Performed by: ANESTHESIOLOGY

## 2025-03-19 PROCEDURE — 1125F AMNT PAIN NOTED PAIN PRSNT: CPT | Performed by: ANESTHESIOLOGY

## 2025-03-19 PROCEDURE — G2211 COMPLEX E/M VISIT ADD ON: HCPCS | Performed by: ANESTHESIOLOGY

## 2025-03-19 PROCEDURE — 1159F MED LIST DOCD IN RCRD: CPT | Performed by: ANESTHESIOLOGY

## 2025-03-19 RX ORDER — OXYCODONE AND ACETAMINOPHEN 7.5; 325 MG/1; MG/1
1 TABLET ORAL EVERY 6 HOURS PRN
Qty: 120 TABLET | Refills: 0 | Status: SHIPPED | OUTPATIENT
Start: 2025-03-23 | End: 2025-04-22

## 2025-03-19 ASSESSMENT — ENCOUNTER SYMPTOMS
ARTHRALGIAS: 1
ACTIVITY CHANGE: 1
BACK PAIN: 1

## 2025-03-19 ASSESSMENT — PAIN SCALES - GENERAL
PAINLEVEL_OUTOF10: 9
PAINLEVEL_OUTOF10: 9

## 2025-03-19 ASSESSMENT — PATIENT HEALTH QUESTIONNAIRE - PHQ9
2. FEELING DOWN, DEPRESSED OR HOPELESS: SEVERAL DAYS
1. LITTLE INTEREST OR PLEASURE IN DOING THINGS: SEVERAL DAYS
SUM OF ALL RESPONSES TO PHQ9 QUESTIONS 1 & 2: 2
10. IF YOU CHECKED OFF ANY PROBLEMS, HOW DIFFICULT HAVE THESE PROBLEMS MADE IT FOR YOU TO DO YOUR WORK, TAKE CARE OF THINGS AT HOME, OR GET ALONG WITH OTHER PEOPLE: SOMEWHAT DIFFICULT

## 2025-03-19 ASSESSMENT — PAIN - FUNCTIONAL ASSESSMENT: PAIN_FUNCTIONAL_ASSESSMENT: 0-10

## 2025-03-19 ASSESSMENT — PAIN DESCRIPTION - DESCRIPTORS: DESCRIPTORS: ACHING

## 2025-03-19 NOTE — PROGRESS NOTES
The patient is a 70-year-old male with low back pain as well as right hip pain.  The patient is in need of total hip arthroplasty.  He has had a consultation with an orthopedic surgeon who offered him an appointment in September.  The patient has a consultation with a new surgeon who may be able to do the surgery next month.  The patient is still experiencing significant residual low back pain after his decompression and fusion surgeries.  He is getting some relief with the use of oxycodone without side effects.    Review of Systems   Constitutional:  Positive for activity change.   Musculoskeletal:  Positive for arthralgias, back pain and gait problem.   All other systems reviewed and are negative.    GENERAL: alert and appropriate, in no distress, well-hydrated, well-nourished and interactive  SKIN: no rash noted, surgical scars well healed  RESPIRATORY: breathing non-labored and no grunting/flaring/retractions  CHEST: equal chest rise with normal respiratory effort  ABDOMEN: soft and non-tender  BACK: back normal in appearance, spine with reduced ROM  EXTREMITIES: strength intact  NEUROLOGIC: Patient seated, SLR negative, sensation grossly intact.    Assessment and Plan    -Chronicity--chronic musculoskeletal pain    -Diagnostics--no new imaging ordered    -Pharmacologic--refill oxycodone.  [facility] and recent toxicology screen results were reviewed and were appropriate.    -Psychologic--no need for psychologic intervention from my standpoint.  There are no mental health issues of which I am aware that are contributing to the patient's pain.  There are no substance abuse or alcohol abuse issues of which I am aware that are contributing to the patient's pain.    -Physical--we discussed the importance of physical therapy and exercise.  We discussed avoidance and modification techniques.    -Intervention--no intervention planned    I spent time educating the patient on the condition including the treatment and  the prognosis.  I invited the patient to call at anytime with any questions.

## 2025-03-27 NOTE — PROGRESS NOTES
Reason for Visit: ED possible consideration for angioplasty    Referred: Self referred     History of Present Illness  Reji Simons is a 70 y.o. year old male patient with h/o CAD and MI x2 PCI x3 last 7/2023, OA left hip, degenerative disc disease with chronic back pain, multiple lumbar surgery with hardware, chronically take prednisone daily for history of pseudogout, current daily smoker (2PPD >30 years), CALLIE L >R and ED. He reported h/o of many years of ED that is refractory to PDE5 inhibitors. Pt was seen by urology Dr. Ordonez 1/6/25 and had Penile duplex doppler US which showed right mix severe arterial and venous disease and left with severe arterial insufficiency w/o venous leakage. Pt was referred to us for possible consideration of angioplasty.     On last office visit on 1/7/25, US results reviewed with the patient and recommendations were smoking cessation and agressive risk factor modification before considering possible angioplasty of the pudendal artery. Pt educated changes in lifestyle can significantly improve his sexual and overall health. Once completed all the recommendations patient to return for re-evaluation.     Notes he Dr. Chun/Dr. Raya at [facility] indicated he needed carotid L surgery due to 80% occlusion, has yet to be schedule for stenting. Waiting to see if he is approved for clinical study. Notes he needs to have his L hip replaced but is delayed until he has his carotid fixed. Has reduced his smoking to <1ppd x 2 months. Reports he will quit smoking when he is given a surgery date for his carotid.  Notes previous attempts have been difficult and notes wellbutrin caused vomiting with his last cessation attempt. Former hx of cocaine abuse, notes that was easier to quit than tobacco. Denies any improvement in his ED symptoms. Notes trimex is only partially effective but notes he has not had many chances to assess his current symptoms.      Past Medical History  Past Medical  History:   Diagnosis Date    Lumbar pain        Past Surgical History  No past surgical history on file.    Social History  Social History     Tobacco Use    Smoking status: Every Day     Current packs/day: 2.00     Average packs/day: 2.0 packs/day for 32.2 years (64.5 ttl pk-yrs)     Types: Cigarettes     Start date: 1/1/1993    Smokeless tobacco: Never   Vaping Use    Vaping status: Never Used   Substance Use Topics    Alcohol use: Yes     Alcohol/week: 4.0 standard drinks of alcohol     Types: 4 Shots of liquor per week     Comment: 4 drinks on occasion    Drug use: Yes     Types: Oxycodone       Family History     Family History   Problem Relation Name Age of Onset    Diabetes Mother      Stroke Mother      Prostate cancer Father         Review of Systems  As per HPI, all other systems reviewed and negative.    Allergies:  Allergies   Allergen Reactions    Titanium Unknown    Varenicline Unknown     Bad dreams, nausea        Outpatient Medications:  Current Outpatient Medications   Medication Instructions    alendronate (Fosamax) 35 mg tablet 1 tablet 30 minutes before the first food, beverage or medicine of the day with plain water Orally for 30 day(s)    aspirin (ASPIR-LOW ORAL) 81 MG   - 1 tablet Orally Once a day for 30 day(s)    carvedilol (COREG) 3.125 mg, 2 times daily (morning and late afternoon)    citalopram (CeleXA) 10 mg tablet 1 tablet Orally Once a day for 30 day(s)    evolocumab (REPATHA SYRINGE SUBQ) Inject under the skin.    gabapentin (Neurontin) 300 mg capsule 1 capsule, 3 times daily    nitroglycerin (Nitrostat) 0.4 mg SL tablet as directed Sublingual    oxyCODONE-acetaminophen (Percocet) 7.5-325 mg tablet 1 tablet, oral, Every 6 hours PRN    pantoprazole (PROTONIX) 40 mg, 2 times daily    predniSONE (DELTASONE) 5 mg, Daily    tadalafil (Cialis) 20 mg tablet 0.5 tablets, As needed    testosterone cypionate (Depo-Testosterone) 200 mg/mL injection 1 ml Intramuscular         Vitals:  There were  no vitals filed for this visit.    Physical Exam:  Constitutional: obese male, overpowering odor of cigarette smoke, NAD, pleasant, cooperative, in wheelchair    Head/Neck: Neck supple, No JVD, no carotid bruits           Respiratory/Thorax: noticeable taking deep breaths every few seconds during conversation     Cardiovascular: Regular, rate and rhythm, no murmurs      Skin: Warm and dry              Extremities: No edema, no discoloration, no open sores, distal pulses palpable bilaterally  Neuro: non-focal, awake/alert/oriented x3,   Psychological: inappropriate behavior      Reviewed Study(s):    Cardiac Studies  Echo: No results found for this or any previous visit.  Angiogram: outside records reviewed: [facility] 4/4/25  80% left ICA stenosis just distal to the bifurcation from soft plaque.     Atherosclerotic changes of the right carotid bifurcation and the carotid   siphons intracranially without high-grade stenosis.  No significant right   carotid bifurcation narrowing (0%).     Hypoplastic left A1, common variant.  Otherwise, patent intracranial   circulation.     Arterial blood flow was measured to detect acute large vessel occlusion   by computer aided detection software: Not Performed.   Concordance between software and imaging review: Not Applicable.     Vascular Studies   Epidural Steroid Injection  Table formatting from the original result was not included.  Procedure  Epidural Steroid Injection    Indication  Postlaminectomy syndrome, lumbar region    Medications  No administrations occurring from 0903 to 0910 on 02/04/25     Preprocedure  A history and physical has been performed, and patient medication   allergies have been reviewed. The patient's tolerance of previous   anesthesia has been reviewed. The risks and benefits of the procedure and   the sedation options and risks were discussed with the patient. All   questions were answered and informed consent obtained.    Details of the Procedure  The  patient was brought to the procedure room and placed in the prone   position.  Sterile prep and drape with ChloraPrep and sterile towels.  6   mL of half percent lidocaine were injected through a 25-gauge needle for   local anesthesia.  A 22-gauge spinal needle was guided through the sacral   hiatus to the caudal epidural space.  After negative aspiration, 1 mL of   contrast was injected through the needle to ensure proper needle   placement.  Then 8 mL of 0.25% percent lidocaine and 60 mg of   triamcinolone were injected through the needle.  The needle was removed   and the patient was transferred to recovery.     Procedure Provider  Mika Damon MD    Procedure Location  [facility] [place]  [address]  [facility] [address] [phone]    Referring Provider  Mika Damon MD  [address]  [address]  [place] [address]  [place] pain management  These images are not reportable by radiology and will not be interpreted   by  Radiologists.       Assessment/Plan   Reji Simons is a 70 y.o. year old male patient with h/o CAD and MI x2 PCI x3 last 7/2023, OA left hip, degenerative disc disease with chronic back pain, multiple lumbar surgery with hardware, chronically take prednisone daily for history of pseudogout, current daily smoker (2PPD >30 years), CALLIE L >R and ED. He reported h/o of many years of ED that is refractory to PDE5 inhibitors. Pt was seen by urology Dr. Ordonez 1/6/25 and had Penile duplex doppler US which showed right mix severe arterial and venous disease and left with severe arterial insufficiency w/o venous leakage. Pt was referred to us for possible consideration of angioplasty.   On last office visit on 1/7/25, US results reviewed with the patient and recommendations were smoking cessation and agressive risk factor modification before considering possible angioplasty of the pudendal artery. Pt educated changes in lifestyle can  significantly improve his sexual and overall health. Once completed all the recommendations patient to return for re-evaluation.     Patient seen and evaluated. Imaging and chart reviewed and discussed with patient  Reviewed CT angio read from [facility] records, evidence of extensive stenosis and atherosclerosis of L ICA.  Encouraged pt to return to his [facility] doctors for management of his ICA stenosis, pt would like to proceed with management of his L ICA with  endovascular services as he is already established   Patient agreeable to proceed with L ICA stenting on 4/23/25   Smoking cessation importance and strategies reviewed with the patient.   Continue with DAPT and Repatha.     We are going to schedule an angioplasty procedure to open the blood flow to your   leg   at [facility] ([address] [place], [address]) with Dr Browne  The cath lab staff will call you the day before the procedure for further instructions and time of the procedure.     The phone number to reach them at is [phone] (M-F, 6:30 am -5 pm)   You will need to have  blood work done prior to the procedure. Please have blood drawn at any  location today or within a week of the procedure.  Plan to stay overnight after the procedure to be discharged the next day. But there is a chance you will be discharged home the same day.   You will need to have a ride to and from the hospital.   Please bring an updated list of all your medications or all the medication on the day of the procedure for review.  Please do not eat or drink anything after midnight before the procedure.   Please HOLD METFORMIN 48 hrs before the procedure.    Please HOLD warfarin (coumadin) for 3 days before your procedure. Last dose    4/20/25 . While you are holding this medication you will need to take another blood thinner (Lovenox) that is injectable twice daily. Please do not inject this medication the morning of the procedure.   If you take Insulin at night, TAKE  HALF a dose of INSULIN the night before procedure and NO INSULIN in the morning of the procedure.   You can take the rest of your medications as prescribed in the morning of the procedure  with a sip of water.   Please DO NOT STOP taking ASPIRIN and PLAVIX before the procedure.   Please call our office with any questions at [phone], press option 1     Leigh Tejada DPM PGY-3    Ruddy Browne, DO

## 2025-04-14 RX ORDER — ESCITALOPRAM OXALATE 10 MG/1
TABLET ORAL
COMMUNITY
Start: 2025-02-07 | End: 2025-04-15 | Stop reason: ALTCHOICE

## 2025-04-14 RX ORDER — NAPROXEN SODIUM 220 MG/1
81 TABLET, FILM COATED ORAL DAILY
COMMUNITY
Start: 2025-02-13

## 2025-04-14 RX ORDER — BUPROPION HYDROCHLORIDE 150 MG/1
TABLET, EXTENDED RELEASE ORAL
COMMUNITY
Start: 2024-11-11 | End: 2025-04-15 | Stop reason: ALTCHOICE

## 2025-04-14 RX ORDER — GLYBURIDE 5 MG/1
1 TABLET ORAL
COMMUNITY
Start: 2025-02-24 | End: 2025-04-15 | Stop reason: ALTCHOICE

## 2025-04-14 RX ORDER — CELECOXIB 200 MG/1
1 CAPSULE ORAL
COMMUNITY
Start: 2025-01-22

## 2025-04-14 RX ORDER — ALBUTEROL SULFATE 90 UG/1
INHALANT RESPIRATORY (INHALATION)
COMMUNITY
Start: 2025-02-08

## 2025-04-14 RX ORDER — FLUTICASONE FUROATE, UMECLIDINIUM BROMIDE AND VILANTEROL TRIFENATATE 100; 62.5; 25 UG/1; UG/1; UG/1
POWDER RESPIRATORY (INHALATION)
COMMUNITY
Start: 2025-02-27

## 2025-04-14 RX ORDER — ESOMEPRAZOLE MAGNESIUM 40 MG/1
CAPSULE, DELAYED RELEASE ORAL
COMMUNITY
Start: 2025-03-01

## 2025-04-14 RX ORDER — DULOXETIN HYDROCHLORIDE 30 MG/1
CAPSULE, DELAYED RELEASE ORAL
COMMUNITY
Start: 2025-01-28 | End: 2025-04-15 | Stop reason: ALTCHOICE

## 2025-04-14 RX ORDER — EVOLOCUMAB 140 MG/ML
INJECTION, SOLUTION SUBCUTANEOUS
COMMUNITY
Start: 2025-02-04

## 2025-04-15 ENCOUNTER — APPOINTMENT (OUTPATIENT)
Dept: CARDIOLOGY | Facility: CLINIC | Age: 70
End: 2025-04-15
Payer: MEDICARE

## 2025-04-15 VITALS
BODY MASS INDEX: 30.31 KG/M2 | WEIGHT: 200 LBS | HEIGHT: 68 IN | SYSTOLIC BLOOD PRESSURE: 108 MMHG | DIASTOLIC BLOOD PRESSURE: 71 MMHG | OXYGEN SATURATION: 99 % | HEART RATE: 119 BPM

## 2025-04-15 DIAGNOSIS — Z01.818 PRE-OP TESTING: ICD-10-CM

## 2025-04-15 DIAGNOSIS — N52.01 ERECTILE DYSFUNCTION DUE TO ARTERIAL INSUFFICIENCY: Primary | ICD-10-CM

## 2025-04-15 PROCEDURE — 99213 OFFICE O/P EST LOW 20 MIN: CPT | Performed by: INTERNAL MEDICINE

## 2025-04-15 PROCEDURE — 1159F MED LIST DOCD IN RCRD: CPT | Performed by: INTERNAL MEDICINE

## 2025-04-15 PROCEDURE — 3074F SYST BP LT 130 MM HG: CPT | Performed by: INTERNAL MEDICINE

## 2025-04-15 PROCEDURE — 3008F BODY MASS INDEX DOCD: CPT | Performed by: INTERNAL MEDICINE

## 2025-04-15 PROCEDURE — 3078F DIAST BP <80 MM HG: CPT | Performed by: INTERNAL MEDICINE

## 2025-04-15 NOTE — PATIENT INSTRUCTIONS
We are going to schedule an angioplasty procedure to open the blood flow to your   leg   [facility] [facility] [address] with Dr Browne  The cath lab staff will call you the day before the procedure for further instructions and time of the procedure.     The phone number to reach them at is [phone] (M-F, 6:30 am -5 pm)   You will need to have  blood work done prior to the procedure. Please have blood drawn at any  location today or within a week of the procedure.  Plan to stay overnight after the procedure to be discharged the next day. But there is a chance you will be discharged home the same day.   You will need to have a ride to and from the hospital.   Please bring an updated list of all your medications or all the medication on the day of the procedure for review.  Please do not eat or drink anything after midnight before the procedure.   Please HOLD METFORMIN 48 hrs before the procedure.    Please HOLD warfarin (coumadin) for 3 days before your procedure. Last dose    4/20/25 . While you are holding this medication you will need to take another blood thinner (Lovenox) that is injectable twice daily. Please do not inject this medication the morning of the procedure.   If you take Insulin at night, TAKE HALF a dose of INSULIN the night before procedure and NO INSULIN in the morning of the procedure.   You can take the rest of your medications as prescribed in the morning of the procedure  with a sip of water.   Please DO NOT STOP taking ASPIRIN and PLAVIX before the procedure.   Please call our office with any questions at [phone], press option 1

## 2025-04-17 ENCOUNTER — PREP FOR PROCEDURE (OUTPATIENT)
Dept: RADIOLOGY | Facility: EXTERNAL LOCATION | Age: 70
End: 2025-04-17
Payer: MEDICARE

## 2025-04-17 DIAGNOSIS — M16.12 PRIMARY OSTEOARTHRITIS OF LEFT HIP: Primary | ICD-10-CM

## 2025-04-17 DIAGNOSIS — M16.11 PRIMARY OSTEOARTHRITIS OF RIGHT HIP: ICD-10-CM

## 2025-04-22 ENCOUNTER — DOCUMENTATION (OUTPATIENT)
Dept: CARDIOLOGY | Facility: HOSPITAL | Age: 70
End: 2025-04-22
Payer: MEDICARE

## 2025-04-22 NOTE — H&P (VIEW-ONLY)
Returned patient's call, reporting n/v/d since Sunday. Given this will reschedule his Carotid Angiogram to 5/14, next available.     Mr. Simons verbalized his understanding

## 2025-04-30 ENCOUNTER — SURGERY (OUTPATIENT)
Age: 70
End: 2025-04-30
Payer: MEDICARE

## 2025-04-30 ENCOUNTER — APPOINTMENT (OUTPATIENT)
Dept: CARDIOLOGY | Facility: HOSPITAL | Age: 70
End: 2025-04-30
Payer: MEDICARE

## 2025-04-30 ENCOUNTER — HOSPITAL ENCOUNTER (OUTPATIENT)
Facility: HOSPITAL | Age: 70
LOS: 1 days | Discharge: HOME | End: 2025-05-01
Attending: INTERNAL MEDICINE | Admitting: INTERNAL MEDICINE
Payer: MEDICARE

## 2025-04-30 DIAGNOSIS — N52.01 ERECTILE DYSFUNCTION DUE TO ARTERIAL INSUFFICIENCY: Primary | ICD-10-CM

## 2025-04-30 DIAGNOSIS — I65.29 CAROTID ARTERY STENOSIS: ICD-10-CM

## 2025-04-30 DIAGNOSIS — Z95.828 PRESENCE OF INTERNAL CAROTID STENT: ICD-10-CM

## 2025-04-30 DIAGNOSIS — I79.8 OTHER DISORDERS OF ARTERIES, ARTERIOLES AND CAPILLARIES IN DISEASES CLASSIFIED ELSEWHERE: ICD-10-CM

## 2025-04-30 DIAGNOSIS — I65.22 OCCLUSION AND STENOSIS OF LEFT CAROTID ARTERY: ICD-10-CM

## 2025-04-30 DIAGNOSIS — J44.9 COPD (CHRONIC OBSTRUCTIVE PULMONARY DISEASE) CASE MANAGEMENT PATIENT (MULTI): ICD-10-CM

## 2025-04-30 LAB
ACT BLD: 231 SEC (ref 83–199)
ACT BLD: 269 SEC (ref 83–199)
ACT BLD: 316 SEC (ref 83–199)
ALBUMIN SERPL BCP-MCNC: 4.1 G/DL (ref 3.4–5)
ALP SERPL-CCNC: 66 U/L (ref 33–136)
ALT SERPL W P-5'-P-CCNC: 11 U/L (ref 10–52)
ANION GAP SERPL CALC-SCNC: 15 MMOL/L (ref 10–20)
AST SERPL W P-5'-P-CCNC: 16 U/L (ref 9–39)
ATRIAL RATE: 81 BPM
BILIRUB DIRECT SERPL-MCNC: 0.2 MG/DL (ref 0–0.3)
BILIRUB SERPL-MCNC: 0.6 MG/DL (ref 0–1.2)
BUN SERPL-MCNC: 15 MG/DL (ref 6–23)
CALCIUM SERPL-MCNC: 9.5 MG/DL (ref 8.6–10.6)
CHLORIDE SERPL-SCNC: 107 MMOL/L (ref 98–107)
CO2 SERPL-SCNC: 22 MMOL/L (ref 21–32)
CREAT SERPL-MCNC: 1.49 MG/DL (ref 0.5–1.3)
EGFRCR SERPLBLD CKD-EPI 2021: 51 ML/MIN/1.73M*2
ERYTHROCYTE [DISTWIDTH] IN BLOOD BY AUTOMATED COUNT: 14.2 % (ref 11.5–14.5)
GLUCOSE BLD MANUAL STRIP-MCNC: 113 MG/DL (ref 74–99)
GLUCOSE BLD MANUAL STRIP-MCNC: 94 MG/DL (ref 74–99)
GLUCOSE SERPL-MCNC: 99 MG/DL (ref 74–99)
HCT VFR BLD AUTO: 45 % (ref 41–52)
HGB BLD-MCNC: 14.6 G/DL (ref 13.5–17.5)
MCH RBC QN AUTO: 28.5 PG (ref 26–34)
MCHC RBC AUTO-ENTMCNC: 32.4 G/DL (ref 32–36)
MCV RBC AUTO: 88 FL (ref 80–100)
NRBC BLD-RTO: 0 /100 WBCS (ref 0–0)
P AXIS: 52 DEGREES
P OFFSET: 189 MS
P ONSET: 131 MS
PLATELET # BLD AUTO: 229 X10*3/UL (ref 150–450)
POTASSIUM SERPL-SCNC: 4.3 MMOL/L (ref 3.5–5.3)
PR INTERVAL: 162 MS
PROT SERPL-MCNC: 7.4 G/DL (ref 6.4–8.2)
Q ONSET: 212 MS
QRS COUNT: 13 BEATS
QRS DURATION: 86 MS
QT INTERVAL: 392 MS
QTC CALCULATION(BAZETT): 455 MS
QTC FREDERICIA: 433 MS
R AXIS: -50 DEGREES
RBC # BLD AUTO: 5.13 X10*6/UL (ref 4.5–5.9)
SODIUM SERPL-SCNC: 140 MMOL/L (ref 136–145)
T AXIS: -5 DEGREES
T OFFSET: 408 MS
VENTRICULAR RATE: 81 BPM
WBC # BLD AUTO: 8.2 X10*3/UL (ref 4.4–11.3)

## 2025-04-30 PROCEDURE — 36415 COLL VENOUS BLD VENIPUNCTURE: CPT | Performed by: INTERNAL MEDICINE

## 2025-04-30 PROCEDURE — 36228 PLACE CATH INTRACRANIAL ART: CPT | Mod: 50 | Performed by: INTERNAL MEDICINE

## 2025-04-30 PROCEDURE — 2550000001 HC RX 255 CONTRASTS: Performed by: INTERNAL MEDICINE

## 2025-04-30 PROCEDURE — 36221 PLACE CATH THORACIC AORTA: CPT | Mod: CCI | Performed by: INTERNAL MEDICINE

## 2025-04-30 PROCEDURE — 82248 BILIRUBIN DIRECT: CPT

## 2025-04-30 PROCEDURE — C1760 CLOSURE DEV, VASC: HCPCS | Performed by: INTERNAL MEDICINE

## 2025-04-30 PROCEDURE — G0378 HOSPITAL OBSERVATION PER HR: HCPCS

## 2025-04-30 PROCEDURE — C1769 GUIDE WIRE: HCPCS | Performed by: INTERNAL MEDICINE

## 2025-04-30 PROCEDURE — 76937 US GUIDE VASCULAR ACCESS: CPT | Performed by: INTERNAL MEDICINE

## 2025-04-30 PROCEDURE — 85347 COAGULATION TIME ACTIVATED: CPT

## 2025-04-30 PROCEDURE — 85347 COAGULATION TIME ACTIVATED: CPT | Performed by: INTERNAL MEDICINE

## 2025-04-30 PROCEDURE — 2720000007 HC OR 272 NO HCPCS: Performed by: INTERNAL MEDICINE

## 2025-04-30 PROCEDURE — 75710 ARTERY X-RAYS ARM/LEG: CPT | Performed by: INTERNAL MEDICINE

## 2025-04-30 PROCEDURE — 7100000011 HC EXTENDED STAY RECOVERY HOURLY - NURSING UNIT

## 2025-04-30 PROCEDURE — C1725 CATH, TRANSLUMIN NON-LASER: HCPCS | Performed by: INTERNAL MEDICINE

## 2025-04-30 PROCEDURE — C1894 INTRO/SHEATH, NON-LASER: HCPCS | Performed by: INTERNAL MEDICINE

## 2025-04-30 PROCEDURE — 37215 TRANSCATH STENT CCA W/EPS: CPT | Performed by: INTERNAL MEDICINE

## 2025-04-30 PROCEDURE — B31RYZZ FLUOROSCOPY OF INTRACRANIAL ARTERIES USING OTHER CONTRAST: ICD-10-PCS | Performed by: INTERNAL MEDICINE

## 2025-04-30 PROCEDURE — 96373 THER/PROPH/DIAG INJ IA: CPT | Performed by: INTERNAL MEDICINE

## 2025-04-30 PROCEDURE — 36223 PLACE CATH CAROTID/INOM ART: CPT | Mod: 50,59 | Performed by: INTERNAL MEDICINE

## 2025-04-30 PROCEDURE — 2780000003 HC OR 278 NO HCPCS: Performed by: INTERNAL MEDICINE

## 2025-04-30 PROCEDURE — 85027 COMPLETE CBC AUTOMATED: CPT | Performed by: INTERNAL MEDICINE

## 2025-04-30 PROCEDURE — 36224 PLACE CATH CAROTD ART: CPT | Mod: 50,59 | Performed by: INTERNAL MEDICINE

## 2025-04-30 PROCEDURE — B317YZZ FLUOROSCOPY OF LEFT INTERNAL CAROTID ARTERY USING OTHER CONTRAST: ICD-10-PCS | Performed by: INTERNAL MEDICINE

## 2025-04-30 PROCEDURE — 037L3DZ DILATION OF LEFT INTERNAL CAROTID ARTERY WITH INTRALUMINAL DEVICE, PERCUTANEOUS APPROACH: ICD-10-PCS | Performed by: INTERNAL MEDICINE

## 2025-04-30 PROCEDURE — 82947 ASSAY GLUCOSE BLOOD QUANT: CPT

## 2025-04-30 PROCEDURE — 2500000004 HC RX 250 GENERAL PHARMACY W/ HCPCS (ALT 636 FOR OP/ED): Mod: JW | Performed by: INTERNAL MEDICINE

## 2025-04-30 PROCEDURE — 2500000001 HC RX 250 WO HCPCS SELF ADMINISTERED DRUGS (ALT 637 FOR MEDICARE OP)

## 2025-04-30 PROCEDURE — 80048 BASIC METABOLIC PNL TOTAL CA: CPT | Performed by: INTERNAL MEDICINE

## 2025-04-30 PROCEDURE — 1100000001 HC PRIVATE ROOM DAILY

## 2025-04-30 PROCEDURE — 99291 CRITICAL CARE FIRST HOUR: CPT

## 2025-04-30 PROCEDURE — G0269 OCCLUSIVE DEVICE IN VEIN ART: HCPCS | Mod: 59 | Performed by: INTERNAL MEDICINE

## 2025-04-30 PROCEDURE — 93010 ELECTROCARDIOGRAM REPORT: CPT | Performed by: INTERNAL MEDICINE

## 2025-04-30 PROCEDURE — C1884 EMBOLIZATION PROTECT SYST: HCPCS | Performed by: INTERNAL MEDICINE

## 2025-04-30 PROCEDURE — 93005 ELECTROCARDIOGRAM TRACING: CPT | Mod: 59

## 2025-04-30 PROCEDURE — 37215 TRANSCATH STENT CCA W/EPS: CPT | Mod: 50 | Performed by: INTERNAL MEDICINE

## 2025-04-30 PROCEDURE — C1876 STENT, NON-COA/NON-COV W/DEL: HCPCS | Performed by: INTERNAL MEDICINE

## 2025-04-30 DEVICE — RX ACCULINK™ CAROTID STENT SYSTEM 7-10 MM X 40 MM X 132 CM RAPID-EXCHANGE
Type: IMPLANTABLE DEVICE | Site: CAROTID | Status: FUNCTIONAL
Brand: ACCULINK™

## 2025-04-30 RX ORDER — ENOXAPARIN SODIUM 100 MG/ML
40 INJECTION SUBCUTANEOUS EVERY 24 HOURS
Status: DISCONTINUED | OUTPATIENT
Start: 2025-05-01 | End: 2025-05-01 | Stop reason: HOSPADM

## 2025-04-30 RX ORDER — CLOPIDOGREL BISULFATE 75 MG/1
75 TABLET ORAL DAILY
Status: DISCONTINUED | OUTPATIENT
Start: 2025-05-01 | End: 2025-05-01 | Stop reason: HOSPADM

## 2025-04-30 RX ORDER — CITALOPRAM 10 MG/1
10 TABLET ORAL DAILY
Status: DISCONTINUED | OUTPATIENT
Start: 2025-05-01 | End: 2025-05-01 | Stop reason: HOSPADM

## 2025-04-30 RX ORDER — FLUTICASONE FUROATE AND VILANTEROL 100; 25 UG/1; UG/1
1 POWDER RESPIRATORY (INHALATION)
Status: DISCONTINUED | OUTPATIENT
Start: 2025-04-30 | End: 2025-05-01 | Stop reason: HOSPADM

## 2025-04-30 RX ORDER — CARVEDILOL 3.12 MG/1
3.12 TABLET ORAL 2 TIMES DAILY
Status: DISCONTINUED | OUTPATIENT
Start: 2025-04-30 | End: 2025-05-01

## 2025-04-30 RX ORDER — CLOPIDOGREL BISULFATE 75 MG/1
75 TABLET ORAL DAILY
COMMUNITY

## 2025-04-30 RX ORDER — ONDANSETRON 4 MG/1
4 TABLET, ORALLY DISINTEGRATING ORAL EVERY 8 HOURS PRN
COMMUNITY
Start: 2025-04-22

## 2025-04-30 RX ORDER — HEPARIN SODIUM 1000 [USP'U]/ML
INJECTION, SOLUTION INTRAVENOUS; SUBCUTANEOUS AS NEEDED
Status: DISCONTINUED | OUTPATIENT
Start: 2025-04-30 | End: 2025-04-30 | Stop reason: HOSPADM

## 2025-04-30 RX ORDER — HYDROXYCHLOROQUINE SULFATE 200 MG/1
200 TABLET, FILM COATED ORAL DAILY
COMMUNITY

## 2025-04-30 RX ORDER — INSULIN LISPRO 100 [IU]/ML
0-5 INJECTION, SOLUTION INTRAVENOUS; SUBCUTANEOUS
Status: DISCONTINUED | OUTPATIENT
Start: 2025-04-30 | End: 2025-04-30

## 2025-04-30 RX ORDER — ALBUTEROL SULFATE 90 UG/1
2 INHALANT RESPIRATORY (INHALATION) EVERY 6 HOURS PRN
Status: DISCONTINUED | OUTPATIENT
Start: 2025-04-30 | End: 2025-05-01 | Stop reason: HOSPADM

## 2025-04-30 RX ORDER — PREDNISONE 5 MG/1
5 TABLET ORAL DAILY
COMMUNITY
End: 2025-05-01 | Stop reason: HOSPADM

## 2025-04-30 RX ORDER — GABAPENTIN 300 MG/1
300 CAPSULE ORAL 3 TIMES DAILY
Status: DISCONTINUED | OUTPATIENT
Start: 2025-04-30 | End: 2025-05-01 | Stop reason: HOSPADM

## 2025-04-30 RX ORDER — DEXTROSE 50 % IN WATER (D50W) INTRAVENOUS SYRINGE
25
Status: DISCONTINUED | OUTPATIENT
Start: 2025-04-30 | End: 2025-04-30

## 2025-04-30 RX ORDER — CARVEDILOL 3.12 MG/1
3.12 TABLET ORAL 2 TIMES DAILY
COMMUNITY
End: 2025-05-01 | Stop reason: HOSPADM

## 2025-04-30 RX ORDER — NAPROXEN SODIUM 220 MG/1
81 TABLET, FILM COATED ORAL DAILY
Status: DISCONTINUED | OUTPATIENT
Start: 2025-05-01 | End: 2025-05-01 | Stop reason: HOSPADM

## 2025-04-30 RX ORDER — DEXTROSE 50 % IN WATER (D50W) INTRAVENOUS SYRINGE
12.5
Status: DISCONTINUED | OUTPATIENT
Start: 2025-04-30 | End: 2025-04-30

## 2025-04-30 RX ORDER — ENOXAPARIN SODIUM 100 MG/ML
40 INJECTION SUBCUTANEOUS EVERY 24 HOURS
Status: DISCONTINUED | OUTPATIENT
Start: 2025-04-30 | End: 2025-04-30

## 2025-04-30 RX ORDER — HYDROXYCHLOROQUINE SULFATE 200 MG/1
200 TABLET, FILM COATED ORAL DAILY
Status: DISCONTINUED | OUTPATIENT
Start: 2025-04-30 | End: 2025-05-01 | Stop reason: HOSPADM

## 2025-04-30 RX ORDER — PRAMIPEXOLE DIHYDROCHLORIDE 0.12 MG/1
0.12 TABLET ORAL 3 TIMES DAILY
COMMUNITY
End: 2025-05-01 | Stop reason: HOSPADM

## 2025-04-30 RX ORDER — PANTOPRAZOLE SODIUM 40 MG/1
40 TABLET, DELAYED RELEASE ORAL
Status: DISCONTINUED | OUTPATIENT
Start: 2025-04-30 | End: 2025-05-01 | Stop reason: HOSPADM

## 2025-04-30 RX ORDER — LIDOCAINE HYDROCHLORIDE 10 MG/ML
INJECTION, SOLUTION EPIDURAL; INFILTRATION; INTRACAUDAL; PERINEURAL AS NEEDED
Status: DISCONTINUED | OUTPATIENT
Start: 2025-04-30 | End: 2025-04-30 | Stop reason: HOSPADM

## 2025-04-30 RX ORDER — OXYCODONE AND ACETAMINOPHEN 7.5; 325 MG/1; MG/1
1 TABLET ORAL EVERY 6 HOURS PRN
COMMUNITY
End: 2025-05-03 | Stop reason: SDUPTHER

## 2025-04-30 RX ORDER — CITALOPRAM 20 MG/1
20 TABLET ORAL DAILY
Status: DISCONTINUED | OUTPATIENT
Start: 2025-05-01 | End: 2025-04-30

## 2025-04-30 RX ORDER — IODIXANOL 320 MG/ML
INJECTION, SOLUTION INTRAVASCULAR AS NEEDED
Status: DISCONTINUED | OUTPATIENT
Start: 2025-04-30 | End: 2025-04-30 | Stop reason: HOSPADM

## 2025-04-30 RX ORDER — OXYCODONE AND ACETAMINOPHEN 5; 325 MG/1; MG/1
1.5 TABLET ORAL EVERY 6 HOURS PRN
Refills: 0 | Status: DISCONTINUED | OUTPATIENT
Start: 2025-04-30 | End: 2025-05-01 | Stop reason: HOSPADM

## 2025-04-30 RX ADMIN — LIDOCAINE HYDROCHLORIDE 10 ML: 10 INJECTION, SOLUTION EPIDURAL; INFILTRATION; INTRACAUDAL; PERINEURAL at 08:46

## 2025-04-30 RX ADMIN — GABAPENTIN 300 MG: 300 CAPSULE ORAL at 21:08

## 2025-04-30 RX ADMIN — HYDROXYCHLOROQUINE SULFATE 200 MG: 200 TABLET, FILM COATED ORAL at 11:26

## 2025-04-30 RX ADMIN — HEPARIN SODIUM 3000 UNITS: 1000 INJECTION INTRAVENOUS; SUBCUTANEOUS at 09:24

## 2025-04-30 RX ADMIN — CARVEDILOL 3.12 MG: 3.12 TABLET, FILM COATED ORAL at 11:26

## 2025-04-30 RX ADMIN — HEPARIN SODIUM 9000 UNITS: 1000 INJECTION INTRAVENOUS; SUBCUTANEOUS at 08:48

## 2025-04-30 RX ADMIN — HEPARIN SODIUM 2000 UNITS: 1000 INJECTION INTRAVENOUS; SUBCUTANEOUS at 09:36

## 2025-04-30 RX ADMIN — IODIXANOL 65 ML: 320 INJECTION, SOLUTION INTRAVASCULAR at 09:58

## 2025-04-30 RX ADMIN — GABAPENTIN 300 MG: 300 CAPSULE ORAL at 15:26

## 2025-04-30 RX ADMIN — PANTOPRAZOLE SODIUM 40 MG: 40 TABLET, DELAYED RELEASE ORAL at 15:26

## 2025-04-30 SDOH — ECONOMIC STABILITY: HOUSING INSECURITY: IN THE LAST 12 MONTHS, WAS THERE A TIME WHEN YOU WERE NOT ABLE TO PAY THE MORTGAGE OR RENT ON TIME?: NO

## 2025-04-30 SDOH — SOCIAL STABILITY: SOCIAL INSECURITY: WITHIN THE LAST YEAR, HAVE YOU BEEN HUMILIATED OR EMOTIONALLY ABUSED IN OTHER WAYS BY YOUR PARTNER OR EX-PARTNER?: NO

## 2025-04-30 SDOH — SOCIAL STABILITY: SOCIAL INSECURITY: HAVE YOU HAD THOUGHTS OF HARMING ANYONE ELSE?: NO

## 2025-04-30 SDOH — SOCIAL STABILITY: SOCIAL INSECURITY: HAS ANYONE EVER THREATENED TO HURT YOUR FAMILY OR YOUR PETS?: NO

## 2025-04-30 SDOH — ECONOMIC STABILITY: FOOD INSECURITY: HOW HARD IS IT FOR YOU TO PAY FOR THE VERY BASICS LIKE FOOD, HOUSING, MEDICAL CARE, AND HEATING?: NOT VERY HARD

## 2025-04-30 SDOH — SOCIAL STABILITY: SOCIAL INSECURITY: ARE YOU OR HAVE YOU BEEN THREATENED OR ABUSED PHYSICALLY, EMOTIONALLY, OR SEXUALLY BY ANYONE?: NO

## 2025-04-30 SDOH — SOCIAL STABILITY: SOCIAL INSECURITY
WITHIN THE LAST YEAR, HAVE YOU BEEN RAPED OR FORCED TO HAVE ANY KIND OF SEXUAL ACTIVITY BY YOUR PARTNER OR EX-PARTNER?: NO

## 2025-04-30 SDOH — SOCIAL STABILITY: SOCIAL INSECURITY: WERE YOU ABLE TO COMPLETE ALL THE BEHAVIORAL HEALTH SCREENINGS?: YES

## 2025-04-30 SDOH — ECONOMIC STABILITY: HOUSING INSECURITY: AT ANY TIME IN THE PAST 12 MONTHS, WERE YOU HOMELESS OR LIVING IN A SHELTER (INCLUDING NOW)?: NO

## 2025-04-30 SDOH — SOCIAL STABILITY: SOCIAL INSECURITY: WITHIN THE LAST YEAR, HAVE YOU BEEN AFRAID OF YOUR PARTNER OR EX-PARTNER?: NO

## 2025-04-30 SDOH — ECONOMIC STABILITY: FOOD INSECURITY: WITHIN THE PAST 12 MONTHS, THE FOOD YOU BOUGHT JUST DIDN'T LAST AND YOU DIDN'T HAVE MONEY TO GET MORE.: NEVER TRUE

## 2025-04-30 SDOH — SOCIAL STABILITY: SOCIAL INSECURITY: ARE THERE ANY APPARENT SIGNS OF INJURIES/BEHAVIORS THAT COULD BE RELATED TO ABUSE/NEGLECT?: NO

## 2025-04-30 SDOH — SOCIAL STABILITY: SOCIAL INSECURITY
WITHIN THE LAST YEAR, HAVE YOU BEEN KICKED, HIT, SLAPPED, OR OTHERWISE PHYSICALLY HURT BY YOUR PARTNER OR EX-PARTNER?: NO

## 2025-04-30 SDOH — ECONOMIC STABILITY: INCOME INSECURITY: IN THE PAST 12 MONTHS HAS THE ELECTRIC, GAS, OIL, OR WATER COMPANY THREATENED TO SHUT OFF SERVICES IN YOUR HOME?: NO

## 2025-04-30 SDOH — ECONOMIC STABILITY: FOOD INSECURITY: WITHIN THE PAST 12 MONTHS, YOU WORRIED THAT YOUR FOOD WOULD RUN OUT BEFORE YOU GOT THE MONEY TO BUY MORE.: NEVER TRUE

## 2025-04-30 SDOH — ECONOMIC STABILITY: HOUSING INSECURITY: IN THE PAST 12 MONTHS, HOW MANY TIMES HAVE YOU MOVED WHERE YOU WERE LIVING?: 0

## 2025-04-30 SDOH — SOCIAL STABILITY: SOCIAL INSECURITY: ABUSE: ADULT

## 2025-04-30 SDOH — SOCIAL STABILITY: SOCIAL INSECURITY: DO YOU FEEL UNSAFE GOING BACK TO THE PLACE WHERE YOU ARE LIVING?: NO

## 2025-04-30 SDOH — SOCIAL STABILITY: SOCIAL INSECURITY: HAVE YOU HAD ANY THOUGHTS OF HARMING ANYONE ELSE?: NO

## 2025-04-30 SDOH — SOCIAL STABILITY: SOCIAL INSECURITY: DO YOU FEEL ANYONE HAS EXPLOITED OR TAKEN ADVANTAGE OF YOU FINANCIALLY OR OF YOUR PERSONAL PROPERTY?: NO

## 2025-04-30 SDOH — ECONOMIC STABILITY: TRANSPORTATION INSECURITY: IN THE PAST 12 MONTHS, HAS LACK OF TRANSPORTATION KEPT YOU FROM MEDICAL APPOINTMENTS OR FROM GETTING MEDICATIONS?: NO

## 2025-04-30 SDOH — SOCIAL STABILITY: SOCIAL INSECURITY: DOES ANYONE TRY TO KEEP YOU FROM HAVING/CONTACTING OTHER FRIENDS OR DOING THINGS OUTSIDE YOUR HOME?: NO

## 2025-04-30 ASSESSMENT — LIFESTYLE VARIABLES
HOW OFTEN DO YOU HAVE A DRINK CONTAINING ALCOHOL: MONTHLY OR LESS
SKIP TO QUESTIONS 9-10: 0
PRESCIPTION_ABUSE_PAST_12_MONTHS: NO
AUDIT-C TOTAL SCORE: 2
HOW OFTEN DO YOU HAVE 6 OR MORE DRINKS ON ONE OCCASION: LESS THAN MONTHLY
SUBSTANCE_ABUSE_PAST_12_MONTHS: NO
HOW MANY STANDARD DRINKS CONTAINING ALCOHOL DO YOU HAVE ON A TYPICAL DAY: 1 OR 2
AUDIT-C TOTAL SCORE: 2

## 2025-04-30 ASSESSMENT — ACTIVITIES OF DAILY LIVING (ADL)
BATHING: INDEPENDENT
JUDGMENT_ADEQUATE_SAFELY_COMPLETE_DAILY_ACTIVITIES: YES
WALKS IN HOME: INDEPENDENT
HEARING - LEFT EAR: FUNCTIONAL
FEEDING YOURSELF: INDEPENDENT
PATIENT'S MEMORY ADEQUATE TO SAFELY COMPLETE DAILY ACTIVITIES?: YES
ASSISTIVE_DEVICE: WALKER
HEARING - RIGHT EAR: FUNCTIONAL
LACK_OF_TRANSPORTATION: NO
DRESSING YOURSELF: INDEPENDENT
LACK_OF_TRANSPORTATION: NO
ADEQUATE_TO_COMPLETE_ADL: YES
TOILETING: INDEPENDENT
GROOMING: INDEPENDENT

## 2025-04-30 ASSESSMENT — COGNITIVE AND FUNCTIONAL STATUS - GENERAL
CLIMB 3 TO 5 STEPS WITH RAILING: A LITTLE
WALKING IN HOSPITAL ROOM: A LITTLE
STANDING UP FROM CHAIR USING ARMS: A LITTLE
PATIENT BASELINE BEDBOUND: NO
PATIENT BASELINE BEDBOUND: NO
MOVING TO AND FROM BED TO CHAIR: A LITTLE
TURNING FROM BACK TO SIDE WHILE IN FLAT BAD: A LITTLE
DAILY ACTIVITIY SCORE: 24

## 2025-04-30 ASSESSMENT — PATIENT HEALTH QUESTIONNAIRE - PHQ9
SUM OF ALL RESPONSES TO PHQ9 QUESTIONS 1 & 2: 0
2. FEELING DOWN, DEPRESSED OR HOPELESS: NOT AT ALL
1. LITTLE INTEREST OR PLEASURE IN DOING THINGS: NOT AT ALL

## 2025-04-30 ASSESSMENT — PAIN SCALES - GENERAL: PAINLEVEL_OUTOF10: 0 - NO PAIN

## 2025-04-30 ASSESSMENT — PAIN - FUNCTIONAL ASSESSMENT: PAIN_FUNCTIONAL_ASSESSMENT: 0-10

## 2025-04-30 NOTE — PROGRESS NOTES
Pharmacy Medication History Review    Reji Simons is a 70 y.o. male admitted for Erectile dysfunction due to arterial insufficiency. Pharmacy reviewed the patient's [id number]-[id number] medications and allergies for accuracy.    Medications ADDED:  Ozempic, percocet, zofran ODT  Medications CHANGED:  Fosamax, gabapentin, depo-testosterone injection  Medications REMOVED:   Albuterol inhaler     The list below reflects the updated PTA list.   Prior to Admission Medications   Prescriptions Last Dose Informant   Repatha SureClick 140 mg/mL injection  Self, Other   Sig: Inject 1 mL (140 mg) under the skin every 14 (fourteen) days.   Trelegy Ellipta 100-62.5-25 mcg blister with device  Self, Other   Sig: Inhale 1 puff once daily. Use as directed   alendronate (Fosamax) 35 mg tablet  Self, Other   Sig: Take 1 tablet (35 mg) by mouth every 7 days.  Patient reported he takes weekly on Sunday    aspirin 81 mg chewable tablet  Self, Other   Sig: Chew 1 tablet (81 mg) once daily.   carvedilol (Coreg) 3.125 mg tablet Not Taking Self, Other   Sig: Take 1 tablet (3.125 mg) by mouth 2 times a day.   Patient not taking: Reported on 4/30/2025  Patient reported he was taken off ~2 months ago due to low blood pressure    celecoxib (CeleBREX) 200 mg capsule  Self, Other   Sig: Take 1 capsule (200 mg) by mouth early in the morning..   citalopram (CeleXA) 10 mg tablet  Self, Other   Sig: Take 1 tablet (10 mg) by mouth once daily.  Last pharmacy dispense 1/10/2025 for 30-day supply. Patient reported he is still taking, as he had a trial of Lexapro that started in February. Per chart review, lexapro was discontinued on 4/15/2025    clopidogrel (Plavix) 75 mg tablet  Self, Other   Sig: Take 1 tablet (75 mg) by mouth once daily.  Last pharmacy dispense 6/9/2024 for 90-day supply. Patient reported that he is still taking and has not run out. He reported he resumed taking plavix again in February    esomeprazole (NexIUM) 40 mg DR capsule  Self,  Other   Sig: Take 1 capsule (40 mg) by mouth 2 times a day. With meals   gabapentin (Neurontin) 800 mg tablet  Self, Other   Sig: Take 1 tablet (800 mg) by mouth 2 times a day.   hydroxychloroquine (Plaquenil) 200 mg tablet  Self, Other   Sig: Take 1 tablet (200 mg) by mouth once daily.   nitroglycerin (Nitrostat) 0.4 mg SL tablet  Self, Other   Sig: Place 1 tablet (0.4 mg) under the tongue every 5 minutes if needed for chest pain.   ondansetron ODT (Zofran-ODT) 4 mg disintegrating tablet  Self, Other   Sig: Dissolve 1 tablet (4 mg) in the mouth every 8 hours if needed for vomiting or nausea.   oxyCODONE-acetaminophen (Percocet) 7.5-325 mg tablet  Self, Other   Sig: Take 1 tablet by mouth every 6 hours if needed for severe pain (7 - 10).   pramipexole (Mirapex) 0.125 mg tablet Not Taking Self, Other   Sig: Take 1 tablet (0.125 mg) by mouth 3 times a day.   Patient not taking: Reported on 4/30/2025   predniSONE (Deltasone) 5 mg tablet Not Taking Self, Other   Sig: Take 1 tablet (5 mg) by mouth once daily.   Patient not taking: Reported on 4/30/2025  Per 4/22/2025 [facility] Medicine office visit note, plaquenil replaced prednisone due to hyperglycemia. Patient reported that he has not been taking prednisone for ~2 months    semaglutide 0.25 mg or 0.5 mg (2 mg/3 mL) pen injector  Self, Other   Sig: Inject 0.25 mg under the skin once a week.  Patient reported he administers on Sunday    tadalafil (Cialis) 20 mg tablet  Self, Other   Sig: Take 0.5 tablets (10 mg) by mouth if needed for erectile dysfunction.   testosterone cypionate (Depo-Testosterone) 200 mg/mL injection  Self, Other   Sig: Inject 0.5 mL (100 mg) into the muscle 1 (one) time per week.  Patient reported he administers on Sunday       Facility-Administered Medications: None        The list below reflects the updated allergy list. Please review each documented allergy for additional clarification and justification.  Allergies  Reviewed by  "Dave Mcfadden, PharmD on 4/30/2025        Severity Reactions Comments    Titanium Not Specified Other Patient reported surgical steel caused swelling    Varenicline Not Specified Other Patient reported side effects- bad dreams, nausea    Wellbutrin [bupropion Hcl] Not Specified Other Patient reported side effects- vomiting, GI upset            Patient declines M2B at discharge.     Sources:   [facility]  Pharmacy dispense history  Patient Interview Moderate historian, able to verify PTA med list with prompting  Chart Review  Care Everywhere  4/22/2025 [facility] Medicine office visit    Additional Comments:  None      Dave Mcfadden, PharmD  Transitions of Care Pharmacist  04/30/25     Secure Chat preferred   If no response call [phone] or Vocera \"Med Rec\"    "

## 2025-04-30 NOTE — Clinical Note
Vessel(s): left carotid artery and left subclavian artery. Injected with power injection. Single view taken.

## 2025-04-30 NOTE — POST-PROCEDURE NOTE
Physician Transition of Care Summary  Invasive Cardiovascular Lab    Procedure Date: 4/30/2025  Attending:    * Ruddy Browne - Primary  Resident/Fellow/Other Assistant: Surgeons and Role:     * Sam Mcmullen MD - Fellow     * Jose Robles MD - Fellow    Indications:   Pre-op Diagnosis      * Erectile dysfunction due to arterial insufficiency [N52.01]    Post-procedure diagnosis:   Post-op Diagnosis     * Erectile dysfunction due to arterial insufficiency [N52.01]    Procedure(s):   Carotid Angiogram  58756 - [place] [facility] CATH CAROTID/INNOM ART ANGIO XTRCRANL ART    Carotid Angiogram  35457 - [place] [facility] CATH CAROTID/INNOM ART ANGIO INTRCRANL ART    Carotid Angiogram  90388 - [place] [facility] CATH INTRNL CAROTID ART ANGIO INTRCRNL ART        Procedure Findings:   Severe 80% disease of Left ICA   S/p successful revascularization and stent placement    Access/Closure:  Right common femoral artery/angioseal/ 6 Fr      Complications:   None    Stents/Implants:   Implants          Stent          Stent, Carotid 7-10mm [id number], .014 Rapid Ex - [id number] - Implanted        Inventory item: STENT, CAROTID 7-10MM [id number], .014 RAPID EX Model/Cat number: [id number]    : [employer] Lot number: [id number]    Device identifier: [id number]        GUDID Information       Request status Successful        Brand name: Acculink™ Version/Model: [id number]    Company name: [employer]. MRI safety info as of 4/30/25: MR Conditional    Contains dry or latex rubber: No      GMDN P.T. name: Bare-metal carotid artery stent                As of 4/30/2025       Status: Implanted                              Anticoagulation/Antiplatelet Plan:   Aspirin/Plavix    Estimated Blood Loss:   5 mL    Anesthesia: Moderate Sedation Anesthesia Staff: No anesthesia staff entered.    Any Specimen(s) Removed:   Order Name Source Comment Collection Info Order Time   CBC Blood, Venous  Collected By: Pema Quiroga RN 4/30/2025   7:15 AM     Release result to [employer]   Immediate        BASIC METABOLIC PANEL Blood, Venous  Collected By: Pema Quiroga RN 4/30/2025  7:15 AM     Release result to [employer]   Immediate            Disposition:   Home tomorrow/CICU      Electronically signed by: Sam Mcmullen MD, 4/30/2025 10:02 AM

## 2025-04-30 NOTE — H&P
Cardiac Intensive Care - History and Physical   Subjective    Reji Simons is a 70 y.o. year old male patient admitted on 4/30/2025 with following ICU needs: Post-procedural monitoring    HPI:  Patient is a 70-year-old male with a history of HTN, HLD, CALLIE, CAD s/p MI s/p PCI x3, pseudogout presenting as a direct admit for L ICA stent placement.    Procedure completed successfully today, patient arrived to CICU for post-op monitoring. Patient feels well, doesn't have any complaints. Was having mild oozing of the R femoral access site.    Review of Systems:  Review of Systems   12-point review of systems negative except for the above noted    Meds    Home medications:  Current Outpatient Medications   Medication Instructions    albuterol 90 mcg/actuation inhaler INHALE TWO PUFFS BY MOUTH AS INSTRUCTED EVERY 4 HOURS AS NEEDED FOR WHEEZING/SHORTNESS OF BREATH    alendronate (Fosamax) 35 mg tablet 1 tablet 30 minutes before the first food, beverage or medicine of the day with plain water Orally for 30 day(s)    aspirin 81 mg, Daily    carvedilol (COREG) 3.125 mg, oral, 2 times daily    celecoxib (CeleBREX) 200 mg capsule 1 capsule, Daily (0630)    citalopram (CeleXA) 10 mg tablet 1 tablet Orally Once a day for 30 day(s)    clopidogrel (PLAVIX) 75 mg, Daily    esomeprazole (NexIUM) 40 mg DR capsule take one capsule by mouth twice a day with meals.    gabapentin (Neurontin) 300 mg capsule 1 capsule, 3 times daily    hydroxychloroquine (PLAQUENIL) 200 mg, oral, Daily    nitroglycerin (Nitrostat) 0.4 mg SL tablet as directed Sublingual    pramipexole (MIRAPEX) 0.125 mg, oral, 3 times daily    predniSONE (DELTASONE) 5 mg, oral, Daily    Repatha SureClick 140 mg/mL injection INJECT 1 PEN (140 MG) UNDER THE SKIN EVERY 2 WEEKS    tadalafil (Cialis) 20 mg tablet 0.5 tablets, As needed    testosterone cypionate (Depo-Testosterone) 200 mg/mL injection 1 ml Intramuscular    Trelegy Ellipta 100-62.5-25 mcg blister with device INHALE  "ONE PUFF BY MOUTH DAILY AS DIRECTED        Inpatient medications:  Scheduled medications  Scheduled Medications[1]  Continuous medications  Continuous Medications[2]  PRN medications  PRN Medications[3]     Objective    Blood pressure 135/80, pulse 82, resp. rate 19, height 1.727 m (5' 8\"), weight 91.6 kg (202 lb), SpO2 93%.     Physical Exam  Constitutional:       General: He is not in acute distress.     Appearance: Normal appearance.   HENT:      Head: Normocephalic and atraumatic.   Eyes:      Extraocular Movements: Extraocular movements intact.      Conjunctiva/sclera: Conjunctivae normal.      Pupils: Pupils are equal, round, and reactive to light.   Cardiovascular:      Rate and Rhythm: Normal rate and regular rhythm.      Pulses: Normal pulses.      Heart sounds: Normal heart sounds.      Comments: Oozing noted over femoral site  Pulmonary:      Effort: Pulmonary effort is normal.      Breath sounds: Normal breath sounds.   Abdominal:      General: Abdomen is flat. There is no distension.      Palpations: Abdomen is soft.   Musculoskeletal:      Right lower leg: No edema.      Left lower leg: No edema.   Skin:     General: Skin is warm and dry.   Neurological:      General: No focal deficit present.      Mental Status: He is alert and oriented to person, place, and time.            Intake/Output Summary (Last 24 hours) at 4/30/2025 1215  Last data filed at 4/30/2025 0959  Gross per 24 hour   Intake --   Output 5 ml   Net -5 ml     Labs:   Results from last 72 hours   Lab Units 04/30/25  0715   SODIUM mmol/L 140   POTASSIUM mmol/L 4.3   CHLORIDE mmol/L 107   CO2 mmol/L 22   BUN mg/dL 15   CREATININE mg/dL 1.49*   GLUCOSE mg/dL 99   CALCIUM mg/dL 9.5   ANION GAP mmol/L 15   EGFR mL/min/1.73m*2 51*      Results from last 72 hours   Lab Units 04/30/25  0715   WBC AUTO x10*3/uL 8.2   HEMOGLOBIN g/dL 14.6   HEMATOCRIT % 45.0   PLATELETS AUTO x10*3/uL 229                 EKG:    NSR with LAFB    Last Echo:   Pulled " from Care Everywhere: completed EF 62% grade I diastolic dysfunction. Unclear when this was performed      Cath Data:  07/2023 - thrombotic occlusion due to patient stopping antiplatelet therapy s/p PTCA to mid RCA. s/p ostial RCA 3.5/8 synergy     02/12/2025 - 2.5/48 xience OM1, 3.5/38 LCX     Summary of Key Imaging Results  US carotid arteries 3/18/25  RIGHT SIDE     Common carotid artery: Plaque visualized without evidence of hemodynamically   significant stenosis.     Internal carotid artery: <50% stenosis consistent with mild carotid artery   disease.     Vertebral artery: Patent and antegrade flow noted.     LEFT SIDE     Common carotid artery: Plaque visualized without evidence of hemodynamically   significant stenosis.     Internal carotid artery: >70% stenosis consistent with severe carotid artery   disease.   Consider referral to a vascular specialist unless already implemented.     Vertebral artery: Patent and antegrade flow noted.       Assessment and Plan     Assessment:  Reji Simons is a 70 y.o. year old male patient with a history of HTN, HLD, CAD s/p PCI (most recent 2/25), CALLIE admitted to the CICU on 04/30/25 for carotid artery stenting and post-op monitoring    Mechanical Ventilation: none  Sedation/Analgesia:  none  Restraints: no    Plan:  NEUROLOGY/PSYCH:  Dx: Restless leg syndrome  Dx: Essential tremor  Management:  Not taking home pramipexole  C/w gabapentin 300mg TID    Dx: MDD  Management:  C/w citalopram 10mg    CARDIOVASCULAR:  Dx: Carotid artery stenosis  L ICA 80% stenosis noted on prior ultrasounds  S/p L ICA stent placement with revascularization  Management:  C/w aspirin 81mg and clopidogrel 75mg  Carotid duplex ultrasound tomorrow morning  Angioseal with no leg movement for two hours    Dx: Hypertension  Dx: Hyperlipidemia  Dx: CAD s/p MI  Prior PCI to ostial RCA in 2023, to OM1/LCX in 2025  PTCA of RCA in 2023  Management:  C/w aspirin 81mg  C/w clopidogrel 75mg  C/w carvedilol  3.125mg BID  Holding Repatha inpatient    PULMONARY:  Dx: COPD  FEV1 64%, FEV1/FVC 0.78  Management:  C/w ICS/LABA/LAMA  Albuterol PRN    RENAL/GENITOURINARY:  Dx: CKD IIIa  Cr on admission of 1.49, prior Cr of 1.2-1.3  Management:  Monitor closely with contrast from procedure    GASTROENTEROLOGY:  Dx: No acute issues    MUSCULOSKELETAL:  Dx: Pseudogout  Dx: Osteoarthritis  Management:  Holding alendronate  C/w home percocet dose  C/w hydroxychloroquine 200mg daily    ICU Check List     ICU Liberation: Intervention:   Assess, Prevent, Manage Pain   Percocet   Both SAT and SBT [] SAT  [] SBT 30-60 min [] Extubate to NC  [] Extubate to NIV  [] Discuss Trach   Choice of analgesia and sedation Salazar Agitation Sedation Scale (RASS): Alert and calm none   Delirium: Assess, prevent and manage  CAM ICU Negative None   Early Mobility and Exercise   [x] PT /OT consult   Family Engagement and Empowerment [x]Family updated []SW consult     FEN  Fluids: PRN  Electrolytes: PRN  Nutrition: Cardiac  Prophylaxis:  DVT ppx: Lovenox  GI ppx: Protonix  Bowel care: None  Hardware:                   Social:  Code: Full Code    HPOA: Usman Simons (son) [phone]   Disposition: ICU                 Gregory Liu MD   04/30/25 at 12:15 PM     Disclaimer: Documentation completed with the information available at the time of input. The times in the chart may not be reflective of actual patient care times, interventions, or procedures. Documentation occurs after the physical care of the patient.           [1] [START ON 5/1/2025] aspirin, 81 mg, oral, Daily  carvedilol, 3.125 mg, oral, BID  [START ON 5/1/2025] citalopram, 20 mg, oral, Daily  [START ON 5/1/2025] clopidogrel, 75 mg, oral, Daily  [START ON 5/1/2025] enoxaparin, 40 mg, subcutaneous, q24h  [START ON 5/1/2025] tiotropium, 2 puff, inhalation, Daily   And  fluticasone furoate-vilanteroL, 1 puff, inhalation, Daily  gabapentin, 300 mg, oral, TID  hydroxychloroquine, 200 mg,  oral, Daily  insulin lispro, 0-5 Units, subcutaneous, TID AC  pantoprazole, 40 mg, oral, BID AC  [2]    [3] PRN medications: albuterol, dextrose, dextrose, glucagon, glucagon

## 2025-04-30 NOTE — Clinical Note
Inflation 1: Pressure = 12 [place]; Duration = 10 sec. Inflation 2: Pressure = 12 [place]; Duration = 5 sec.

## 2025-04-30 NOTE — PROGRESS NOTES
Pharmacy Admission Order Reconciliation Review    Reji Simons is a 70 y.o. male admitted for Erectile dysfunction due to arterial insufficiency. Pharmacy reviewed the patient's unreconciled admission medications.    Prior to admission medications that were reviewed and acted on by the pharmacist include:  zofran ODT  percocet  ozempic  These medications have been reconciled.     Any other unreconcilied medications have been addressed and will be ordered or held by the patient's medical team. Medications addressed by the pharmacist may be added or changed by the patient's medical team at any time.    Dave Mcfadden, PharmD   Transitions of Care Pharmacist    Please reach out via Secure Chat for questions

## 2025-05-01 ENCOUNTER — APPOINTMENT (OUTPATIENT)
Dept: VASCULAR MEDICINE | Facility: HOSPITAL | Age: 70
End: 2025-05-01
Payer: MEDICARE

## 2025-05-01 VITALS
WEIGHT: 204.81 LBS | RESPIRATION RATE: 17 BRPM | HEART RATE: 79 BPM | SYSTOLIC BLOOD PRESSURE: 105 MMHG | BODY MASS INDEX: 31.04 KG/M2 | OXYGEN SATURATION: 93 % | TEMPERATURE: 97.2 F | DIASTOLIC BLOOD PRESSURE: 68 MMHG | HEIGHT: 68 IN

## 2025-05-01 LAB
ALBUMIN SERPL BCP-MCNC: 3.7 G/DL (ref 3.4–5)
ANION GAP SERPL CALC-SCNC: 14 MMOL/L (ref 10–20)
BASOPHILS # BLD AUTO: 0.04 X10*3/UL (ref 0–0.1)
BASOPHILS NFR BLD AUTO: 0.6 %
BUN SERPL-MCNC: 13 MG/DL (ref 6–23)
CALCIUM SERPL-MCNC: 9 MG/DL (ref 8.6–10.6)
CHLORIDE SERPL-SCNC: 107 MMOL/L (ref 98–107)
CO2 SERPL-SCNC: 23 MMOL/L (ref 21–32)
CREAT SERPL-MCNC: 1.29 MG/DL (ref 0.5–1.3)
EGFRCR SERPLBLD CKD-EPI 2021: 60 ML/MIN/1.73M*2
EOSINOPHIL # BLD AUTO: 0.2 X10*3/UL (ref 0–0.7)
EOSINOPHIL NFR BLD AUTO: 3 %
ERYTHROCYTE [DISTWIDTH] IN BLOOD BY AUTOMATED COUNT: 14.3 % (ref 11.5–14.5)
GLUCOSE SERPL-MCNC: 81 MG/DL (ref 74–99)
HCT VFR BLD AUTO: 38.4 % (ref 41–52)
HGB BLD-MCNC: 12.9 G/DL (ref 13.5–17.5)
IMM GRANULOCYTES # BLD AUTO: 0.03 X10*3/UL (ref 0–0.7)
IMM GRANULOCYTES NFR BLD AUTO: 0.5 % (ref 0–0.9)
LYMPHOCYTES # BLD AUTO: 1.7 X10*3/UL (ref 1.2–4.8)
LYMPHOCYTES NFR BLD AUTO: 25.9 %
MAGNESIUM SERPL-MCNC: 1.82 MG/DL (ref 1.6–2.4)
MCH RBC QN AUTO: 28.3 PG (ref 26–34)
MCHC RBC AUTO-ENTMCNC: 33.6 G/DL (ref 32–36)
MCV RBC AUTO: 84 FL (ref 80–100)
MONOCYTES # BLD AUTO: 0.52 X10*3/UL (ref 0.1–1)
MONOCYTES NFR BLD AUTO: 7.9 %
NEUTROPHILS # BLD AUTO: 4.07 X10*3/UL (ref 1.2–7.7)
NEUTROPHILS NFR BLD AUTO: 62.1 %
NRBC BLD-RTO: 0 /100 WBCS (ref 0–0)
PHOSPHATE SERPL-MCNC: 3.4 MG/DL (ref 2.5–4.9)
PLATELET # BLD AUTO: 196 X10*3/UL (ref 150–450)
POTASSIUM SERPL-SCNC: 4.5 MMOL/L (ref 3.5–5.3)
RBC # BLD AUTO: 4.56 X10*6/UL (ref 4.5–5.9)
SODIUM SERPL-SCNC: 139 MMOL/L (ref 136–145)
WBC # BLD AUTO: 6.6 X10*3/UL (ref 4.4–11.3)

## 2025-05-01 PROCEDURE — 80069 RENAL FUNCTION PANEL: CPT

## 2025-05-01 PROCEDURE — 96372 THER/PROPH/DIAG INJ SC/IM: CPT

## 2025-05-01 PROCEDURE — 2500000001 HC RX 250 WO HCPCS SELF ADMINISTERED DRUGS (ALT 637 FOR MEDICARE OP)

## 2025-05-01 PROCEDURE — 99238 HOSP IP/OBS DSCHRG MGMT 30/<: CPT

## 2025-05-01 PROCEDURE — 97165 OT EVAL LOW COMPLEX 30 MIN: CPT | Mod: GO

## 2025-05-01 PROCEDURE — 2500000004 HC RX 250 GENERAL PHARMACY W/ HCPCS (ALT 636 FOR OP/ED): Mod: JZ

## 2025-05-01 PROCEDURE — 97161 PT EVAL LOW COMPLEX 20 MIN: CPT | Mod: GP

## 2025-05-01 PROCEDURE — 7100000011 HC EXTENDED STAY RECOVERY HOURLY - NURSING UNIT

## 2025-05-01 PROCEDURE — 85025 COMPLETE CBC W/AUTO DIFF WBC: CPT

## 2025-05-01 PROCEDURE — 36415 COLL VENOUS BLD VENIPUNCTURE: CPT

## 2025-05-01 PROCEDURE — 93880 EXTRACRANIAL BILAT STUDY: CPT | Performed by: SURGERY

## 2025-05-01 PROCEDURE — 83735 ASSAY OF MAGNESIUM: CPT

## 2025-05-01 PROCEDURE — 93880 EXTRACRANIAL BILAT STUDY: CPT

## 2025-05-01 PROCEDURE — 99291 CRITICAL CARE FIRST HOUR: CPT

## 2025-05-01 RX ORDER — ALBUTEROL SULFATE 90 UG/1
2 INHALANT RESPIRATORY (INHALATION) EVERY 6 HOURS PRN
Qty: 6.7 G | Refills: 1 | Status: SHIPPED | OUTPATIENT
Start: 2025-05-01

## 2025-05-01 RX ADMIN — CITALOPRAM HYDROBROMIDE 10 MG: 10 TABLET ORAL at 08:59

## 2025-05-01 RX ADMIN — PANTOPRAZOLE SODIUM 40 MG: 40 TABLET, DELAYED RELEASE ORAL at 08:59

## 2025-05-01 RX ADMIN — OXYCODONE HYDROCHLORIDE AND ACETAMINOPHEN 1.5 TABLET: 5; 325 TABLET ORAL at 03:43

## 2025-05-01 RX ADMIN — CARVEDILOL 3.12 MG: 3.12 TABLET, FILM COATED ORAL at 08:59

## 2025-05-01 RX ADMIN — ENOXAPARIN SODIUM 40 MG: 100 INJECTION SUBCUTANEOUS at 08:59

## 2025-05-01 RX ADMIN — HYDROXYCHLOROQUINE SULFATE 200 MG: 200 TABLET, FILM COATED ORAL at 08:59

## 2025-05-01 RX ADMIN — ASPIRIN 81 MG: 81 TABLET, CHEWABLE ORAL at 08:59

## 2025-05-01 RX ADMIN — CLOPIDOGREL BISULFATE 75 MG: 75 TABLET, FILM COATED ORAL at 08:59

## 2025-05-01 RX ADMIN — GABAPENTIN 300 MG: 300 CAPSULE ORAL at 08:59

## 2025-05-01 SDOH — ECONOMIC STABILITY: HOUSING INSECURITY: IN THE LAST 12 MONTHS, WAS THERE A TIME WHEN YOU WERE NOT ABLE TO PAY THE MORTGAGE OR RENT ON TIME?: NO

## 2025-05-01 SDOH — SOCIAL STABILITY: SOCIAL INSECURITY: WITHIN THE LAST YEAR, HAVE YOU BEEN AFRAID OF YOUR PARTNER OR EX-PARTNER?: NO

## 2025-05-01 SDOH — SOCIAL STABILITY: SOCIAL INSECURITY: ARE YOU MARRIED, WIDOWED, DIVORCED, SEPARATED, NEVER MARRIED, OR LIVING WITH A PARTNER?: WIDOWED

## 2025-05-01 SDOH — ECONOMIC STABILITY: FOOD INSECURITY: WITHIN THE PAST 12 MONTHS, YOU WORRIED THAT YOUR FOOD WOULD RUN OUT BEFORE YOU GOT THE MONEY TO BUY MORE.: NEVER TRUE

## 2025-05-01 SDOH — ECONOMIC STABILITY: INCOME INSECURITY: IN THE PAST 12 MONTHS HAS THE ELECTRIC, GAS, OIL, OR WATER COMPANY THREATENED TO SHUT OFF SERVICES IN YOUR HOME?: NO

## 2025-05-01 SDOH — ECONOMIC STABILITY: TRANSPORTATION INSECURITY: IN THE PAST 12 MONTHS, HAS LACK OF TRANSPORTATION KEPT YOU FROM MEDICAL APPOINTMENTS OR FROM GETTING MEDICATIONS?: NO

## 2025-05-01 SDOH — ECONOMIC STABILITY: FOOD INSECURITY: WITHIN THE PAST 12 MONTHS, THE FOOD YOU BOUGHT JUST DIDN'T LAST AND YOU DIDN'T HAVE MONEY TO GET MORE.: NEVER TRUE

## 2025-05-01 SDOH — ECONOMIC STABILITY: HOUSING INSECURITY: IN THE PAST 12 MONTHS, HOW MANY TIMES HAVE YOU MOVED WHERE YOU WERE LIVING?: 0

## 2025-05-01 SDOH — SOCIAL STABILITY: SOCIAL INSECURITY: WITHIN THE LAST YEAR, HAVE YOU BEEN HUMILIATED OR EMOTIONALLY ABUSED IN OTHER WAYS BY YOUR PARTNER OR EX-PARTNER?: NO

## 2025-05-01 SDOH — ECONOMIC STABILITY: FOOD INSECURITY: HOW HARD IS IT FOR YOU TO PAY FOR THE VERY BASICS LIKE FOOD, HOUSING, MEDICAL CARE, AND HEATING?: NOT VERY HARD

## 2025-05-01 SDOH — ECONOMIC STABILITY: HOUSING INSECURITY: AT ANY TIME IN THE PAST 12 MONTHS, WERE YOU HOMELESS OR LIVING IN A SHELTER (INCLUDING NOW)?: NO

## 2025-05-01 ASSESSMENT — PAIN SCALES - GENERAL
PAINLEVEL_OUTOF10: 5 - MODERATE PAIN
PAINLEVEL_OUTOF10: 0 - NO PAIN

## 2025-05-01 ASSESSMENT — COGNITIVE AND FUNCTIONAL STATUS - GENERAL
MOBILITY SCORE: 19
DAILY ACTIVITIY SCORE: 20
STANDING UP FROM CHAIR USING ARMS: A LITTLE
TOILETING: A LITTLE
CLIMB 3 TO 5 STEPS WITH RAILING: A LITTLE
PATIENT BASELINE BEDBOUND: NO
DRESSING REGULAR LOWER BODY CLOTHING: A LITTLE
DAILY ACTIVITIY SCORE: 24
HELP NEEDED FOR BATHING: A LITTLE
STANDING UP FROM CHAIR USING ARMS: A LITTLE
CLIMB 3 TO 5 STEPS WITH RAILING: A LITTLE
WALKING IN HOSPITAL ROOM: A LITTLE
DRESSING REGULAR UPPER BODY CLOTHING: A LITTLE
WALKING IN HOSPITAL ROOM: A LITTLE
TURNING FROM BACK TO SIDE WHILE IN FLAT BAD: A LITTLE
MOVING TO AND FROM BED TO CHAIR: A LITTLE
MOBILITY SCORE: 20
MOVING TO AND FROM BED TO CHAIR: A LITTLE

## 2025-05-01 ASSESSMENT — PAIN - FUNCTIONAL ASSESSMENT
PAIN_FUNCTIONAL_ASSESSMENT: 0-10

## 2025-05-01 ASSESSMENT — ACTIVITIES OF DAILY LIVING (ADL)
BATHING_ASSISTANCE: MINIMAL
LACK_OF_TRANSPORTATION: NO
ADL_ASSISTANCE: INDEPENDENT
ADL_ASSISTANCE: INDEPENDENT

## 2025-05-01 NOTE — PROGRESS NOTES
05/01/25 1310   Discharge Planning   Living Arrangements Alone   Support Systems Children;Family members   Assistance Needed Patient was independent with completing ADLs prior to admission.   Type of Residence Private residence   Do you have animals or pets at home? No   Who is requesting discharge planning? Provider   Home or Post Acute Services In home services   Type of Home Care Services Home OT   Expected Discharge Disposition Home   Does the patient need discharge transport arranged? No   Financial Resource Strain   How hard is it for you to pay for the very basics like food, housing, medical care, and heating? Not very   Housing Stability   In the last 12 months, was there a time when you were not able to pay the mortgage or rent on time? N   In the past 12 months, how many times have you moved where you were living? 0   At any time in the past 12 months, were you homeless or living in a shelter (including now)? N   Transportation Needs   In the past 12 months, has lack of transportation kept you from medical appointments or from getting medications? no   In the past 12 months, has lack of transportation kept you from meetings, work, or from getting things needed for daily living? No     - ICU TREATMENT PLAN: Patient was admitted to CICU for monitoring after L ICA stent placement.   - Payer: [employer].  -Support System: Son, siblings  - Planned Disposition: Patient is scheduled to be discharged to home today. Rehab team recommends Low intensity. SW discussed rehab recommendations with the patient. Patient declined home care services.   - Additional Information: SDOH and Social Work Discharge Planning assessments were completed with the patient. There were no SDOH issues identified.   -ADLs: Patient is independent with completing ADLs.   -Home Care: denies   -DME: denies  -HD: denies  - Barriers to discharge: None at this time. SW will continue to follow.

## 2025-05-01 NOTE — CARE PLAN
Problem: Fall/Injury  Goal: Not fall by end of shift  Outcome: Progressing  Goal: Be free from injury by end of the shift  Outcome: Progressing  Goal: Verbalize understanding of personal risk factors for fall in the hospital  Outcome: Progressing  Goal: Verbalize understanding of risk factor reduction measures to prevent injury from fall in the home  Outcome: Progressing  Goal: Use assistive devices by end of the shift  Outcome: Progressing  Goal: Pace activities to prevent fatigue by end of the shift  Outcome: Progressing     Problem: Pain - Adult  Goal: Verbalizes/displays adequate comfort level or baseline comfort level  Outcome: Progressing     Problem: Safety - Adult  Goal: Free from fall injury  Outcome: Progressing     Problem: Discharge Planning  Goal: Discharge to home or other facility with appropriate resources  Outcome: Progressing     Problem: Chronic Conditions and Co-morbidities  Goal: Patient's chronic conditions and co-morbidity symptoms are monitored and maintained or improved  Outcome: Progressing     Problem: Nutrition  Goal: Nutrient intake appropriate for maintaining nutritional needs  Outcome: Progressing

## 2025-05-01 NOTE — PROGRESS NOTES
Occupational Therapy    Evaluation    Patient Name: Reji Simons  MRN: [id number]  [facility]  Room: 19/19-A  Today's Date: 5/1/2025  Time Calculation  Start Time: 0905  Stop Time: 0920  Time Calculation (min): 15 min    Assessment  IP OT Assessment  OT Assessment: Patient s/p (L) ICA stent placement; patient with deficits in ADLs/functional mobility, will benefit from OT services to address deficits.  Prognosis: Good  Barriers to Discharge Home: No anticipated barriers  End of Session Communication: Bedside nurse  End of Session Patient Position: Bed, 3 rail up, Alarm off, not on at start of session  Plan:  Treatment Interventions: ADL retraining, Functional transfer training, UE strengthening/ROM, Endurance training, Patient/family training, Equipment evaluation/education, Compensatory technique education  OT Frequency: 2 times per week  OT Discharge Recommendations: Low intensity level of continued care  Equipment Recommended upon Discharge:  (TBD)  OT Recommended Transfer Status: Stand by assist  OT - OK to Discharge: Yes    Subjective   General:  General  Reason for Referral: s/p (L) ICA stent placement  Past Medical History Relevant to Rehab: HTN, HLD, CALLIE, CAD s/p MI s/p PCI x3, pseudogout  Family/Caregiver Present: No  Prior to Session Communication: Bedside nurse  Patient Position Received: Bed, 3 rail up, Alarm off, not on at start of session  General Comment: Patient just finishing with PT on arrival, agreeable to OT.  Precautions:  Hearing/Visual Limitations: hearing is WFL  Medical Precautions: Cardiac precautions, Fall precautions    Vital Signs     Vitals Session Pre OT During OT Post OT   /74  115/72   HR 83  91   SpO2 97  96   MAP (mmHg) 81  84     Pain:  Pain Assessment  Pain Assessment: 0-10  0-10 (Numeric) Pain Score:  (reports 8/10 hip pain which is a chronic issue)    Objective   Cognition:  Overall Cognitive Status: Within Functional Limits  Arousal/Alertness: Appropriate  responses to stimuli  Orientation Level: Oriented X4  Following Commands: Follows all commands and directions without difficulty        Salazar Agitation Sedation Scale  Salazar Agitation Sedation Scale (RASS): Alert and calm  Home Living:  Type of Home: House  Lives With: Alone  Home Adaptive Equipment: Walker rolling or standard, Cane  Home Layout: One level, Laundry in basement  Home Access: Stairs to enter with rails  Entrance Stairs-Number of Steps: 2  Bathroom Shower/Tub: Tub/shower unit  Bathroom Equipment: Grab bars in shower (has shower chair but doesn't use it)   Prior Function:  Level of [place]: Independent with ADLs and functional transfers, Needs assistance with homemaking  Receives Help From: Family  ADL Assistance: Independent  Homemaking Assistance:  (sisters do patient's laundry as patient has difficulty with basement stairs)  Ambulatory Assistance: Independent  Vocational: Part time employment ()  Hand Dominance: Right  Prior Function Comments: (+) drives     ADL:  Eating Assistance: Independent  Eating Deficit: Setup  Grooming Assistance: Independent  Grooming Deficit:  (in sitting)  Bathing Assistance: Minimal  Bathing Deficit:  (anticipated)  UE Dressing Assistance: Stand by  UE Dressing Deficit:  (anticipated)  LE Dressing Assistance: Minimal  LE Dressing Deficit:  (to don underwear and shorts)  Toileting Assistance with Device: Stand by  Toileting Deficit: Supervison/safety (anticipated)  Activity Tolerance:  Early Mobility/Exercise Safety Screen: Proceed with mobilization - No exclusion criteria met  Bed Mobility/Transfers: Bed Mobility  Bed Mobility: Yes  Bed Mobility 1  Bed Mobility 1: Supine to sitting, Sitting to supine  Level of Assistance 1: Distant supervision    Transfers  Transfer: Yes  Transfer 1  Transfer From 1: Sit to, Stand to  Transfer to 1: Sit, Stand  Technique 1: Sit to stand, Stand to sit  Transfer Level of Assistance 1: Close supervision  Trials/Comments  1: functional mobility short household distances with no AD and SBA  Transfers 2  Trials/Comments 2: toilet transfer with SBA    Sitting Balance:  Static Sitting Balance  Static Sitting-Level of Assistance: Independent  Dynamic Sitting Balance  Dynamic Sitting-Level of Assistance: Distant supervision  Standing Balance:  Static Standing Balance  Static Standing-Level of Assistance: Close supervision  Dynamic Standing Balance  Dynamic Standing-Level of Assistance: Close supervision     Vision: Vision - Basic Assessment  Current Vision: Wears glasses only for reading  Sensation:  Light Touch: No apparent deficits  Strength:  Strength Comments: (B)  WFL, (B) elbows >/= 3/5, (B) shoulders grossly 3-/5 to 3/5     Extremities: RUE   RUE :  (shoulder grossly WFL, patient reports limitations from chronic rotator cuff issue; distal joints WFL) and FAN CHAVIRA:  (shoulder grossly WFL, patient reports limitations from chronic rotator cuff issue; distal joints WFL)    Outcome Measures: [facility] Daily Activity  Putting on and taking off regular lower body clothing: A little  Bathing (including washing, rinsing, drying): A little  Putting on and taking off regular upper body clothing: A little  Toileting, which includes using toilet, bedpan or urinal: A little  Taking care of personal grooming such as brushing teeth: None  Eating Meals: None  Daily Activity - Total Score: 20    , Confusion Assessment Method-ICU (CAM-ICU)  Feature 1: Acute Onset or Fluctuating Course: Negative  Feature 3: Altered Level of Consciousness: Negative  Overall CAM-ICU: Negative  , and E = Exercise and Early Mobility  Early Mobility/Exercise Safety Screen: Proceed with mobilization - No exclusion criteria met  ICU Mobility Scale: Walking with assistance of 1 person  Education Documentation  Body Mechanics, taught by Victoria Pemberton OT at 5/1/2025 11:42 AM.  Learner: Patient  Readiness: Acceptance  Method: Explanation  Response: Verbalizes  Understanding  Comment: ADL participation, mobility precautions    Precautions, taught by Victoria Pemberton OT at 5/1/2025 11:42 AM.  Learner: Patient  Readiness: Acceptance  Method: Explanation  Response: Verbalizes Understanding  Comment: ADL participation, mobility precautions    ADL Training, taught by Victoria Pemberton OT at 5/1/2025 11:42 AM.  Learner: Patient  Readiness: Acceptance  Method: Explanation  Response: Verbalizes Understanding  Comment: ADL participation, mobility precautions    Education Comments  No comments found.    Goals:   Encounter Problems       Encounter Problems (Active)       ADLs       Patient will complete daily grooming tasks with independent level of assistance and PRN adaptive equipment while standing. (Progressing)       Start:  05/01/25    Expected End:  05/15/25            Patient will complete toileting including hygiene clothing management/hygiene with independent level of assistance. (Progressing)       Start:  05/01/25    Expected End:  05/15/25            Patient will perform UB dressing with Independent and PRN adaptive equipment. (Progressing)       Start:  05/01/25    Expected End:  05/15/25            Patient will perform LB dressing with Independent and PRN adaptive equipment. (Progressing)       Start:  05/01/25    Expected End:  05/15/25            Patient will perform basic IADLs/simulated household tasks with MOD (I) and LRD. (Progressing)       Start:  05/01/25    Expected End:  05/15/25               EXERCISE/STRENGTHENING       Patient will be educated on BUE HEP for increased ADL performance. (Progressing)       Start:  05/01/25    Expected End:  05/15/25            Patient will participate in >15 minutes of activity with <2 rest breaks to increase ADL/functional task endurance. (Progressing)       Start:  05/01/25    Expected End:  05/15/25               MOBILITY       Patient will perform Functional mobility Household distances/Community Distances with  modified independent level of assistance and least restrictive device in order to improve safety and functional mobility. (Progressing)       Start:  05/01/25    Expected End:  05/15/25               TRANSFERS       Patient will perform bed mobility independent level of assistance in order to improve safety and independence with mobility (Progressing)       Start:  05/01/25    Expected End:  05/15/25            Patient will complete functional transfers with least restrictive device with modified independent level of assistance. (Progressing)       Start:  05/01/25    Expected End:  05/15/25               Victoria Pemberton OTR/L  Inpatient Occupational Therapist   Rehab Office: [phone]

## 2025-05-01 NOTE — HOSPITAL COURSE
Patient is a 70-year-old male with a history of HTN, HLD, CALLIE, CAD s/p MI s/p PCI x3, pseudogout presenting as a direct admit for L ICA stent placement.     Procedure completed successfully today, patient arrived to CICU for post-op monitoring. Patient feels well, doesn't have any complaints. Was having mild oozing of the R femoral access site. Overnight no events, his carotid ultrasound was negative for acute abnormalities. Discharged in stable condition.

## 2025-05-01 NOTE — DISCHARGE SUMMARY
Discharge Diagnosis  Stenosis of left carotid artery    Issues Requiring Follow-Up  -Post-op follow-up with Cardiology/Vascular Surgery    Test Results Pending At Discharge  Pending Labs       No current pending labs.            Hospital Course  Patient is a 70-year-old male with a history of HTN, HLD, CALLIE, CAD s/p MI s/p PCI x3, pseudogout presenting as a direct admit for L ICA stent placement.     Procedure completed successfully today, patient arrived to CICU for post-op monitoring. Patient feels well, doesn't have any complaints. Was having mild oozing of the R femoral access site. Overnight no events, his carotid ultrasound was negative for acute abnormalities. Discharged in stable condition.    Pertinent Physical Exam At Time of Discharge  Physical Exam  Constitutional:       General: He is not in acute distress.     Appearance: Normal appearance.   HENT:      Head: Normocephalic and atraumatic.   Eyes:      Extraocular Movements: Extraocular movements intact.      Conjunctiva/sclera: Conjunctivae normal.      Pupils: Pupils are equal, round, and reactive to light.   Cardiovascular:      Rate and Rhythm: Normal rate and regular rhythm.      Pulses: Normal pulses.      Heart sounds: Normal heart sounds.   Pulmonary:      Effort: Pulmonary effort is normal.      Breath sounds: Normal breath sounds.   Abdominal:      General: Abdomen is flat. There is no distension.      Palpations: Abdomen is soft.   Musculoskeletal:      Right lower leg: No edema.      Left lower leg: No edema.   Skin:     General: Skin is warm and dry.   Neurological:      General: No focal deficit present.      Mental Status: He is alert and oriented to person, place, and time.         Home Medications     Medication List      CHANGE how you take these medications     albuterol 90 mcg/actuation inhaler; Inhale 2 puffs every 6 hours if   needed for wheezing.; What changed: See the new instructions.     CONTINUE taking these medications      alendronate 35 mg tablet; Commonly known as: Fosamax   aspirin 81 mg chewable tablet   celecoxib 200 mg capsule; Commonly known as: CeleBREX   CeleXA 10 mg tablet; Generic drug: citalopram   Cialis 20 mg tablet; Generic drug: tadalafil   clopidogrel 75 mg tablet; Commonly known as: Plavix   Depo-Testosterone 200 mg/mL injection; Generic drug: testosterone   cypionate   esomeprazole 40 mg DR capsule; Commonly known as: NexIUM   gabapentin 800 mg tablet; Commonly known as: Neurontin   hydroxychloroquine 200 mg tablet; Commonly known as: Plaquenil   nitroglycerin 0.4 mg SL tablet; Commonly known as: Nitrostat   ondansetron ODT 4 mg disintegrating tablet; Commonly known as:   Zofran-ODT   oxyCODONE-acetaminophen 7.5-325 mg tablet; Commonly known as: Percocet   Repatha SureClick 140 mg/mL injection; Generic drug: evolocumab   semaglutide 0.25 mg or 0.5 mg (2 mg/3 mL) pen injector   Trelegy Ellipta 100-62.5-25 mcg blister with device; Generic drug:   fluticasone-umeclidin-vilanter     STOP taking these medications     carvedilol 3.125 mg tablet; Commonly known as: Coreg   pramipexole 0.125 mg tablet; Commonly known as: Mirapex   predniSONE 5 mg tablet; Commonly known as: Deltasone       Outpatient Follow-Up  Future Appointments   Date Time Provider Department Center   5/6/2025 12:00 PM [facility] OR [address] [place] [address]   6/3/2025  2:00 PM [facility] 2 [id number] [facility]   6/3/2025  3:00 PM Ruddy Browne DO [id number] [facility]       Gregory Liu MD

## 2025-05-01 NOTE — DISCHARGE INSTRUCTIONS
What you came in for:  You came in for a carotid artery stenting procedure which occurred with no complications.    What you should do:  You were monitored in the ICU for one day following your procedure with no concerns.    You should resume all your home medications, especially your aspirin and clopidogrel.    Return to activity as normal, keep your next appointments with your Vascular surgeons and Cardiologists    What you should watch out for:  Please call your doctor or present to the ER if you notice worsening headache, neck pain, trouble swallowing, or any one sided weakness/numbness/trouble speaking as these can indicate injury or clotting in the carotid artery.

## 2025-05-01 NOTE — PROGRESS NOTES
Subjective Data:  Patient doing well, off bedrest, worked with MATTHEW Landry post stent pending discharge. Discussed that a temporary drop in blood pressure after carotid stenting is not uncommon, often due to manipulation of the carotid artery during the procedure that could affect body's blood pressure regulation mechanisms. Rest and stay hydrated. Monitor your blood pressure at home, if the drop is significant and you experience dizziness, fainting, or chest pain reach out to us immediately.          Overnight Events:    None reported     Objective Data:  Last Recorded Vitals:  Vitals:    05/01/25 0851 05/01/25 0900 05/01/25 0906 05/01/25 1000   BP:   126/83 106/70   Pulse: 83 79 91 84   Resp:  15  18   Temp:       TempSrc:       SpO2: 97% 97% 95% 91%   Weight:       Height:           Last Labs:  CBC - 5/1/2025:  3:53 AM  6.6 12.9 196    38.4      CMP - 5/1/2025:  3:53 AM  9.0 7.4 16 --- 0.6   3.4 3.7 11 66      PTT - No results in last year.  _   _ _     HGBA1C   Date/Time Value Ref Range Status   01/17/2025 02:35 PM 7.2 4.3 - 5.6 % Final     Comment:     American Diabetes Association guidelines indicate that patients with HgbA1c in the range 5.7-6.4% are at increased risk for development of diabetes, and intervention by lifestyle modification may be beneficial. HgbA1c greater or equal to 6.5% is considered diagnostic of diabetes.   07/09/2023 05:18 PM 6.1 4.3 - 5.6 % Final     Comment:     American Diabetes Association guidelines indicate that patients with HgbA1c in the range 5.7-6.4% are at increased risk for development of diabetes, and intervention by lifestyle modification may be beneficial. HgbA1c greater or equal to 6.5% is considered diagnostic of diabetes.      Last I/O:  I/O last 3 completed shifts:  In: 240 (2.6 mL/kg) [P.O.:240]  Out: 555 (6 mL/kg) [Urine:550 (0.2 mL/kg/hr); Blood:5]  Weight: 92.9 kg     Past Cardiology Tests (Last 3 Years):        Inpatient Medications:  Scheduled  Medications[1]  PRN Medications[2]  Continuous Medications[3]    Physical Exam:  Constitutional: NAD, pleasant, cooperative     Head/Neck: Neck supple, No JVD, no carotid bruits           Respiratory/Thorax: CTAB, thorax symmetric     Cardiovascular: Regular, rate and rhythm, no murmurs, normal S 1 and S 2    Gastrointestinal: Nondistended, soft, non-tender, +BS       Skin: Warm and dry              Extremities: HENRIQUEZ, No edema, no discoloration,  rt groin dressing removed, no bleeding or hematoma. Pulses palpable BL  Neuro: non-focal, awake/alert/oriented x3,   Psychological: Appropriate mood and behavior          Assessment/Plan   Reji Simons is a 70 y.o. year old male patient with h/o CAD and MI x2 PCI x3 last 7/2023, OA left hip, degenerative disc disease with chronic back pain, multiple lumbar surgery with hardware, chronically take prednisone daily for history of pseudogout, current daily smoker (2PPD >30 years), CALLIE L >R and ED.    Had a long discussion with the patient about the harmful effects of smoking and the importance of cessation. Patient reported not smoking for 3 days prior to procedure .  Stroke symptoms reviewed with the patient (Balance, Eyes,Face, Arm, Speech, Time). Discussed the risk factors and importance of early recognition and emergency response. Discussed risk factors and lifestyle modifications.      Severe 80% disease of Left ICA   S/p successful revascularization and stent placement with Dr Browne on 4/30      Plan:  - reviewed Carotid Duplex post stenting-patent   Today pt underwent successful PTA of  ICA with  stent placement, with Dr Browne  -access site Rt CFA, closed with AngioSeal, BR for 2 hrs   -post procedure NISSH 0  -pt was loaded with Plavix 300 mg and  mg in the cath lab  -continue with daily ASA, Plavix and statin  -f/u  in 1 month with Dr Browne with repeat carotid US       Discussed with EVLS Fellow Dr Mcmullen, patient ready for  discharge        Peripheral IV 04/30/25 20 G Anterior;Left Forearm (Active)   Site Assessment Clean;Dry;Intact;No hematoma;Soft 05/01/25 0800   Dressing Type Transparent 05/01/25 0800   Line Status Flushed;Capped 05/01/25 0800   Dressing Status Clean;Dry;Occlusive 05/01/25 0800   Number of days: 1       Code Status:  Full Code    I spent 35 minutes in the professional and overall care of this patient.        Jaimee Hirsch, APRN-CNP  Endovascular/Limb Salvage Service   Days: [id number]/Haiku: [facility] Team  [id number]          [1]   Scheduled medications   Medication Dose Route Frequency    aspirin  81 mg oral Daily    carvedilol  3.125 mg oral BID    citalopram  10 mg oral Daily    clopidogrel  75 mg oral Daily    enoxaparin  40 mg subcutaneous q24h    tiotropium  2 puff inhalation Daily    And    fluticasone furoate-vilanteroL  1 puff inhalation Daily    gabapentin  300 mg oral TID    hydroxychloroquine  200 mg oral Daily    pantoprazole  40 mg oral BID AC   [2]   PRN medications   Medication    albuterol    oxyCODONE-acetaminophen   [3]   Continuous Medications   Medication Dose Last Rate

## 2025-05-01 NOTE — PROGRESS NOTES
Physical Therapy    Physical Therapy Evaluation    Patient Name: Reji Simons  MRN: [id number]  Department: [facility]  Room: 19/19-A  Today's Date: 5/1/2025   Time Calculation  Start Time: 0851  Stop Time: 0906  Time Calculation (min): 15 min    Assessment/Plan   PT Assessment  PT Assessment Results: Decreased strength, Decreased endurance, Impaired balance, Decreased mobility  Rehab Prognosis: Excellent  Barriers to Discharge Home: No anticipated barriers  Evaluation/Treatment Tolerance: Patient tolerated treatment well  Medical Staff Made Aware: Yes  End of Session Communication: Bedside nurse  Assessment Comment: Pt performed bed mobility, functional transfers, and ambulated 110' with SBA-CGA this date without AD. Pt will continue to benefit from skilled PT to improve functional mobility.  End of Session Patient Position: Bed, 3 rail up, Alarm off, not on at start of session  IP OR SWING BED PT PLAN  Inpatient or Swing Bed: Inpatient  PT Plan  Treatment/Interventions: Bed mobility, Transfer training, Balance training, Gait training, Stair training, Strengthening, Endurance training, Range of motion, Therapeutic exercise, Therapeutic activity, Home exercise program, Positioning  PT Plan: Ongoing PT  PT Frequency: 3 times per week  PT Discharge Recommendations: Low intensity level of continued care  PT Recommended Transfer Status: Assist x1  PT - OK to Discharge: Yes    Subjective   General Visit Information:  General  Reason for Referral: s/p (L) ICA stent placement  Past Medical History Relevant to Rehab: HTN, HLD, CALLIE, CAD s/p MI s/p PCI x3, pseudogout  Prior to Session Communication: Bedside nurse  Patient Position Received: Bed, 3 rail up, Alarm off, not on at start of session  Preferred Learning Style: auditory, verbal, visual  General Comment: Pt awake and willing to participate in PT evaluation. (Lines: BP cuff, tele, on room air, ext cath)  Home Living:  Home Living  Type of Home: House  Lives With:  Alone  Home Adaptive Equipment: Walker rolling or standard, Cane (was not using prior to admission)  Home Layout: One level, Laundry in basement  Home Access: Stairs to enter with rails  Entrance Stairs-Number of Steps: 2  Bathroom Shower/Tub: Tub/shower unit  Bathroom Equipment: Grab bars in shower  Prior Level of Function:  Prior Function Per Pt/Caregiver Report  Level of Woods: Independent with ADLs and functional transfers, Needs assistance with homemaking  Receives Help From: Family  ADL Assistance: Independent  Homemaking Assistance: Needs assistance (sisters come over to do pt's laundry because he has difficulty performing flight of stairs to basement)  Ambulatory Assistance: Independent  Vocational: Part time employment ()  Prior Function Comments: Drives, no falls  Precautions:  Precautions  Hearing/Visual Limitations: WFL  Medical Precautions: Cardiac precautions, Fall precautions      Date/Time Vitals Session Patient Position Pulse Resp SpO2 BP MAP (mmHg)    05/01/25 0851 Pre PT  --  83  --  97 %  --  --     05/01/25 0900 --  --  79  15  97 %  --  --     05/01/25 0906 Post PT  Lying  91  --  95 %  126/83  96           Vital Signs Comment: Vitals stable during session     Objective   Pain:  Pain Assessment  Pain Assessment: 0-10  0-10 (Numeric) Pain Score:  (Did not rank however reported back/L hip soreness)  Pain Type: Chronic pain  Pain Location: Hip  Pain Interventions: Repositioned  Response to Interventions: Resting quietly  Cognition:  Cognition  Overall Cognitive Status: Within Functional Limits  Arousal/Alertness: Appropriate responses to stimuli  Orientation Level: Oriented X4    General Assessments:    Activity Tolerance  Endurance: Tolerates 10 - 20 min exercise with multiple rests  Early Mobility/Exercise Safety Screen: Proceed with mobilization - No exclusion criteria met  Activity Tolerance Comments: Vitals stable during session    Sensation  Light Touch: No apparent  deficits    Strength  Strength Comments: Grossly at least 3/5 based on functional mobility.  Strength  Strength Comments: Grossly at least 3/5 based on functional mobility.    Perception  Inattention/Neglect: Appears intact  Initiation: Appears intact  Motor Planning: Appears intact  Perseveration: Not present    Coordination  Movements are Fluid and Coordinated: Yes    Postural Control  Postural Control: Within Functional Limits    Static Sitting Balance  Static Sitting-Balance Support: Feet supported  Static Sitting-Level of Assistance: Close supervision    Static Standing Balance  Static Standing-Balance Support: No upper extremity supported  Static Standing-Level of Assistance: Contact guard  Functional Assessments:  Bed Mobility  Bed Mobility: Yes  Bed Mobility 1  Bed Mobility 1: Supine to sitting, Sitting to supine  Level of Assistance 1: Close supervision  Bed Mobility Comments 1: SBA for safety and line management    Transfers  Transfer: Yes  Transfer 1  Transfer From 1: Sit to, Stand to  Transfer to 1: Stand, Sit  Technique 1: Sit to stand, Stand to sit  Transfer Level of Assistance 1: Close supervision  Trials/Comments 1: SBA for safety and line management    Ambulation/Gait Training  Ambulation/Gait Training Performed: Yes  Ambulation/Gait Training 1  Surface 1: Level tile  Device 1: No device  Assistance 1: Contact guard  Quality of Gait 1: Decreased step length, Forward flexed posture  Comments/Distance (ft) 1: x110' with CGA with FWW; observed inconsistent/asymmetrical step pattern likely related to pt's reported L chronic hip pain  Extremity/Trunk Assessments:  Cervical Spine   Cervical Spine: Within Functional Limits  Lumbar Spine   Lumbar Spine : Within Functional Limits    RLE   RLE : Within Functional Limits  LLE   LLE : Within Functional Limits  Outcome Measures:  [facility] Basic Mobility  Turning from your back to your side while in a flat bed without using bedrails: None  Moving from lying on your  back to sitting on the side of a flat bed without using bedrails: None  Moving to and from bed to chair (including a wheelchair): A little  Standing up from a chair using your arms (e.g. wheelchair or bedside chair): A little  To walk in hospital room: A little  Climbing 3-5 steps with railing: A little  Basic Mobility - Total Score: 20    FSS-ICU  Ambulation: Walks >/ or equal to 50 feet with any assistance x1  Rolling: Complete independence  Sitting: Complete independence  Transfer Sit-to-Stand: Supervision or set-up only  Transfer Supine-to-Sit: Supervision or set-up only  Total Score: 26      Early Mobility/Exercise Safety Screen: Proceed with mobilization - No exclusion criteria met  ICU Mobility Scale: Walking with assistance of 1 person [8]    Encounter Problems       Encounter Problems (Active)       Balance       Pt will demonstrate improved sitting/standing static/dynamic balance activities without LOB via score of 24/28 on the Tinetti for increase in safety prior to DC.  (Progressing)       Start:  05/01/25    Expected End:  05/15/25               Mobility       Pt will ambulate >200ft with appropriate form independently with LRAD, and no LOB for safe DC.  (Progressing)       Start:  05/01/25    Expected End:  05/15/25            Pt will tolerate >15 minutes of upright standing activity without seated rest breaks with no changes in vital signs for improved functional mobility.  (Progressing)       Start:  05/01/25    Expected End:  05/15/25            Pt will ambulate up/down 2 stairs with hand rail with CGA or less to safely access their home upon DC.   (Progressing)       Start:  05/01/25    Expected End:  05/15/25               PT Transfers       Pt will perform bed mobility and sit<>stand transfers independently to safely DC.  (Progressing)       Start:  05/01/25    Expected End:  05/15/25                   Education Documentation  Precautions, taught by Rose Lucio, PT at 5/1/2025 10:07  AM.  Learner: Patient  Readiness: Acceptance  Method: Explanation  Response: Verbalizes Understanding    Body Mechanics, taught by Dana Valverde PT at 5/1/2025 10:07 AM.  Learner: Patient  Readiness: Acceptance  Method: Explanation  Response: Verbalizes Understanding    Mobility Training, taught by Dana Valverde PT at 5/1/2025 10:07 AM.  Learner: Patient  Readiness: Acceptance  Method: Explanation  Response: Verbalizes Understanding    Education Comments  No comments found.        DANA VALVERDE PT

## 2025-05-02 RX ORDER — OXYCODONE AND ACETAMINOPHEN 7.5; 325 MG/1; MG/1
1 TABLET ORAL EVERY 6 HOURS PRN
Refills: 0 | Status: CANCELLED | OUTPATIENT
Start: 2025-05-02

## 2025-05-02 NOTE — TELEPHONE ENCOUNTER
Attempted to call report to 6th floor [facility]. RN was unavailable to take report. Will re-attempt.   Pt last seen 3/19/2025

## 2025-05-03 DIAGNOSIS — M96.1 POSTLAMINECTOMY SYNDROME, LUMBAR REGION: Primary | ICD-10-CM

## 2025-05-03 RX ORDER — OXYCODONE AND ACETAMINOPHEN 7.5; 325 MG/1; MG/1
1 TABLET ORAL EVERY 6 HOURS PRN
Qty: 120 TABLET | Refills: 0 | Status: SHIPPED | OUTPATIENT
Start: 2025-05-03

## 2025-05-06 ENCOUNTER — HOSPITAL ENCOUNTER (OUTPATIENT)
Dept: OPERATING ROOM | Facility: HOSPITAL | Age: 70
Discharge: HOME | End: 2025-05-06
Payer: MEDICARE

## 2025-05-06 VITALS
TEMPERATURE: 97.2 F | HEART RATE: 76 BPM | SYSTOLIC BLOOD PRESSURE: 128 MMHG | HEIGHT: 68 IN | RESPIRATION RATE: 17 BRPM | DIASTOLIC BLOOD PRESSURE: 68 MMHG | BODY MASS INDEX: 30.92 KG/M2 | OXYGEN SATURATION: 97 % | WEIGHT: 204 LBS

## 2025-05-06 DIAGNOSIS — M16.12 PRIMARY OSTEOARTHRITIS OF LEFT HIP: ICD-10-CM

## 2025-05-06 PROCEDURE — 3600000006 HC OR TIME - EACH INCREMENTAL 1 MINUTE - PROCEDURE LEVEL ONE

## 2025-05-06 PROCEDURE — 20610 DRAIN/INJ JOINT/BURSA W/O US: CPT | Performed by: ANESTHESIOLOGY

## 2025-05-06 PROCEDURE — 2500000004 HC RX 250 GENERAL PHARMACY W/ HCPCS (ALT 636 FOR OP/ED): Mod: JZ | Performed by: ANESTHESIOLOGY

## 2025-05-06 PROCEDURE — 3600000001 HC OR TIME - INITIAL BASE CHARGE - PROCEDURE LEVEL ONE

## 2025-05-06 PROCEDURE — 2550000001 HC RX 255 CONTRASTS: Mod: JW | Performed by: ANESTHESIOLOGY

## 2025-05-06 PROCEDURE — 7100000010 HC PHASE TWO TIME - EACH INCREMENTAL 1 MINUTE

## 2025-05-06 PROCEDURE — 7100000009 HC PHASE TWO TIME - INITIAL BASE CHARGE

## 2025-05-06 RX ORDER — TRIAMCINOLONE ACETONIDE 40 MG/ML
INJECTION, SUSPENSION INTRA-ARTICULAR; INTRAMUSCULAR AS NEEDED
Status: COMPLETED | OUTPATIENT
Start: 2025-05-06 | End: 2025-05-06

## 2025-05-06 RX ORDER — LIDOCAINE HYDROCHLORIDE 5 MG/ML
INJECTION, SOLUTION INFILTRATION; INTRAVENOUS AS NEEDED
Status: COMPLETED | OUTPATIENT
Start: 2025-05-06 | End: 2025-05-06

## 2025-05-06 RX ADMIN — LIDOCAINE HYDROCHLORIDE 10 ML: 5 INJECTION, SOLUTION INFILTRATION at 12:06

## 2025-05-06 RX ADMIN — IOHEXOL 1 ML: 240 INJECTION, SOLUTION INTRATHECAL; INTRAVASCULAR; INTRAVENOUS; ORAL at 12:06

## 2025-05-06 RX ADMIN — TRIAMCINOLONE ACETONIDE 40 MG: 40 INJECTION, SUSPENSION INTRA-ARTICULAR; INTRAMUSCULAR at 12:06

## 2025-05-06 ASSESSMENT — PAIN SCALES - GENERAL
PAINLEVEL_OUTOF10: 9
PAINLEVEL_OUTOF10: 6

## 2025-05-06 ASSESSMENT — PAIN - FUNCTIONAL ASSESSMENT
PAIN_FUNCTIONAL_ASSESSMENT: 0-10
PAIN_FUNCTIONAL_ASSESSMENT: 0-10

## 2025-05-06 ASSESSMENT — COLUMBIA-SUICIDE SEVERITY RATING SCALE - C-SSRS
2. HAVE YOU ACTUALLY HAD ANY THOUGHTS OF KILLING YOURSELF?: NO
6. HAVE YOU EVER DONE ANYTHING, STARTED TO DO ANYTHING, OR PREPARED TO DO ANYTHING TO END YOUR LIFE?: NO
1. IN THE PAST MONTH, HAVE YOU WISHED YOU WERE DEAD OR WISHED YOU COULD GO TO SLEEP AND NOT WAKE UP?: NO

## 2025-05-06 ASSESSMENT — PAIN DESCRIPTION - DESCRIPTORS: DESCRIPTORS: ACHING;DULL

## 2025-05-06 NOTE — PERIOPERATIVE NURSING NOTE
Patient alert and awake.   Band aid dry and intact x 1 to right low back.  Discharge instructions reviewed with patient.   Patient ambulates without difficulty, pt denies weakness of lower extremities

## 2025-05-06 NOTE — H&P
History Of Present Illness  Reji Simons is a 70 y.o. male presenting with right hip pain.     Past Medical History  Medical History[1]    Surgical History  Surgical History[2]     Social History  He reports that he has been smoking cigarettes. He started smoking about 32 years ago. He has a 64.7 pack-year smoking history. He has never used smokeless tobacco. He reports current alcohol use of about 4.0 standard drinks of alcohol per week. He reports current drug use. Drug: Oxycodone.    Family History  Family History[3]     Allergies  Titanium, Varenicline, and Wellbutrin [bupropion hcl]    Review of Systems     Physical Exam     Last Recorded Vitals  There were no vitals taken for this visit.    Relevant Results             Assessment & Plan  Primary osteoarthritis of left hip      Right intra-articular hip injection    I spent 10 minutes in the professional and overall care of this patient.      Mika Thomas MD         [1]   Past Medical History:  Diagnosis Date    Lumbar pain    [2]   Past Surgical History:  Procedure Laterality Date    EPIDURAL STEROID INJECTION  10/31/2024    CAUDAL DR MIKA THOMAS    INVASIVE VASCULAR PROCEDURE N/A 4/30/2025    Procedure: Carotid Angiogram;  Surgeon: Ruddy Browne DO;  Location: [place] [address] Cardiac Cath Lab;  Service: Cardiovascular;  Laterality: N/A;   [3]   Family History  Problem Relation Name Age of Onset    Diabetes Mother      Stroke Mother      Prostate cancer Father

## 2025-05-06 NOTE — DISCHARGE INSTRUCTIONS
You underwent a procedure today    After most procedures, it is recommended that you relax and limit your activity for the remainder of the day unless you have been told otherwise by your pain physician.  You should not drive a car, operate machinery, or make important legal decisions unless otherwise directed by your pain physician.  You may resume your normal activity, including exercise, tomorrow.      Keep a written pain diary of how much pain relief you experienced following the procedure and the length of time of pain relief you experienced pain relief. Following diagnostic injections like medial branch nerve blocks, sacroiliac joint blocks, stellate ganglion injections and other blocks, it is very important you record the specific amount of pain relief you experienced immediately after the injectionand how long it lasted. Your doctor will ask you for this information at your follow up visit.     For all injections, please keep the injection site dry and inspect the site for a couple of days. You may remove the Band-Aid the day of the injection at any time.     Some discomfort, bruising or slight swelling may occur at the injection site. This is not abnormal if it occurs.  If needed you may:    -Take over the counter medication such as Tylenol or Motrin.   -Apply an ice pack for 30 minutes, 2 to 3 times a day for the first 24 hours.     You may shower today; no soaking baths, hot tubs, whirlpools or swimming pools for two days.      If you are given steroids in your injection, it may take 3-5 days for the steroid medication to take effect. You may notice a worsening of your symptoms for 1-2 days after the injection. This is not abnormal.  You may use acetaminophen, ibuprofen, or prescription medication that your doctor may have prescribed for you if you need to do so.     A few common side effects of steroids include facial flushing, sweating, restlessness, irritability,difficulty sleeping, increase in blood  sugar, and increased blood pressure. If you have diabetes, please monitor your blood sugar at least once a day for at least 5 days. If you have poorly controlled high blood pressure, monitoryour blood pressure for at least 2 days and contact your primary care physician if these numbers are unusually high for you.      If you take aspirin or non-steroidal anti-inflammatory drugs (examples are Motrin, Advil, ibuprofen, Naprosyn, Voltaren, Relafen, etc.) you may restart these this evening.    You do not need to discontinue non-aspirin-containing pain medications prior to an injection (examples: Celebrex, tramadol, hydrocodone and acetaminophen).      If you take a blood thinning medication (Coumadin, Lovenox, Fragmin,Ticlid, Plavix, Pradaxa, etc.), please discuss this with your primary care physician/cardiologist and your pain physician. These medications MUST be discontinued before you can have an injection safely, without the risk of uncontrolled bleeding. If these medications are not discontinued for an appropriate period of time, you will not be able to receivean injection.      If you are taking Coumadin, please have your INR checked the morning of your procedure and bringthe result to your appointment unless otherwise instructed. If your INR is over 1.2, your injection may need to be rescheduled to avoid uncontrolled bleeding from the needle placement.     Call [facility] at [phone] between 8am-4pm Monday - Friday if you are experiencing the following:    If you received an epidural or spinal injection:    -Headache that doesnot go away with medicine, is worse when sitting or standing up, and is greatly relieved upon lying down.   -Severe pain worse than or different than your baseline pain.   -Chills or fever (101º F or greater).   -Drainage or signs of infection at the injection site     Go directly to the Emergency Department if you are experiencing the following and received an  epidural or spinal injection:   -Abrupt weakness or progressive weakness in your legs that starts after you leave the clinic.   -Abrupt severe or worsening numbness in your legs.   -Inability to urinate after the injection or loss of bowel or bladder control without the urge to defecate or urinate.     If you have a clinical question that cannot wait until your next appointment, please call [phone] between 8am-4pm Monday - Friday or send a [employer] message. We do our best to return all non-emergency messages within 24 hours, Monday - Friday. A nurse or physician will return your message.      If you need to cancel an appointment, please call the scheduling staff at [phone] during normal business hours or leave a message at least 24 hours in advance.     If you are going to be sedated for your next procedure, you MUST have responsible adult who can legally drive accompany you home. You cannot eat or drink for eight hours prior to the planned procedure if you are going to receive sedation. You may take your non-blood thinning medications with a small sip of water.

## 2025-05-12 LAB
ATRIAL RATE: 81 BPM
P AXIS: 52 DEGREES
P OFFSET: 189 MS
P ONSET: 131 MS
PR INTERVAL: 162 MS
Q ONSET: 212 MS
QRS COUNT: 13 BEATS
QRS DURATION: 86 MS
QT INTERVAL: 392 MS
QTC CALCULATION(BAZETT): 455 MS
QTC FREDERICIA: 433 MS
R AXIS: -50 DEGREES
T AXIS: -5 DEGREES
T OFFSET: 408 MS
VENTRICULAR RATE: 81 BPM

## 2025-06-02 NOTE — PROGRESS NOTES
Reason for Visit: 1 month follow-up post carotid intervention  Referred: Self referred     History of Present Illness  Reji Simons is a 70 y.o. year old male patient with h/o CAD and MI x2 PCI x3 last 7/2023, OA left hip, degenerative disc disease with chronic back pain, multiple lumbar surgery with hardware, chronically take prednisone daily for history of pseudogout, current daily smoker (2PPD >30 years), CALLIE L >R and ED. He reported h/o of many years of ED that is refractory to PDE5 inhibitors. Pt was seen by urology Dr. Ordonez 1/6/25 and had Penile duplex doppler US which showed right mix severe arterial and venous disease and left with severe arterial insufficiency w/o venous leakage. Pt was referred to us for possible consideration of angioplasty.     On last office visit on 1/7/25, US results reviewed with the patient and recommendations were smoking cessation and agressive risk factor modification before considering possible angioplasty of the pudendal artery. Pt educated changes in lifestyle can significantly improve his sexual and overall health. Once completed all the recommendations patient to return for re-evaluation.     Notes he Dr. Chun/Dr. Raya at [facility] indicated he needed carotid L surgery due to 80% occlusion, has yet to be schedule for stenting. Waiting to see if he is approved for clinical study. Notes he needs to have his L hip replaced but is delayed until he has his carotid fixed. Has reduced his smoking to <1ppd x 2 months. Reports he will quit smoking when he is given a surgery date for his carotid.  Notes previous attempts have been difficult and notes wellbutrin caused vomiting with his last cessation attempt. Former hx of cocaine abuse, notes that was easier to quit than tobacco. Denies any improvement in his ED symptoms. Notes trimex is only partially effective but notes he has not had many chances to assess his current symptoms.     He is now s/p successful revascularization  and stent placement of LEFT ICA (previously 80% occlusion) with Dr. Browne on 4/30     Past Medical History  Past Medical History:   Diagnosis Date    Lumbar pain        Past Surgical History  Past Surgical History:   Procedure Laterality Date    EPIDURAL STEROID INJECTION  10/31/2024    CAUDAL DR SWETA THOMAS    INVASIVE VASCULAR PROCEDURE N/A 4/30/2025    Procedure: Carotid Angiogram;  Surgeon: Ruddy Browne DO;  Location: [place] [address] Cardiac Cath Lab;  Service: Cardiovascular;  Laterality: N/A;       Social History  Social History     Tobacco Use    Smoking status: Every Day     Current packs/day: 2.00     Average packs/day: 2.0 packs/day for 32.4 years (64.8 ttl pk-yrs)     Types: Cigarettes     Start date: 1/1/1993    Smokeless tobacco: Never   Vaping Use    Vaping status: Never Used   Substance Use Topics    Alcohol use: Yes     Alcohol/week: 4.0 standard drinks of alcohol     Types: 4 Shots of liquor per week     Comment: 4 drinks on occasion    Drug use: Yes     Types: Oxycodone       Family History     Family History   Problem Relation Name Age of Onset    Diabetes Mother      Stroke Mother      Prostate cancer Father         Review of Systems  As per HPI, all other systems reviewed and negative.    Allergies:  Allergies   Allergen Reactions    Titanium Other     Patient reported surgical steel caused swelling    Varenicline Other     Patient reported side effects- bad dreams, nausea    Wellbutrin [Bupropion Hcl] Other     Patient reported side effects- vomiting, GI upset        Outpatient Medications:  Current Outpatient Medications   Medication Instructions    albuterol 90 mcg/actuation inhaler 2 puffs, inhalation, Every 6 hours PRN    alendronate (Fosamax) 35 mg tablet Take 1 tablet (35 mg) by mouth every 7 days.    aspirin 81 mg, Daily    celecoxib (CeleBREX) 200 mg capsule 1 capsule, Daily (0630)    citalopram (CeleXA) 10 mg tablet Take 1 tablet (10 mg) by mouth once daily.     clopidogrel (PLAVIX) 75 mg, Daily    esomeprazole (NexIUM) 40 mg DR capsule Take 1 capsule (40 mg) by mouth 2 times a day. With meals    gabapentin (NEURONTIN) 800 mg, 2 times daily    hydroxychloroquine (PLAQUENIL) 200 mg, Daily    nitroglycerin (Nitrostat) 0.4 mg SL tablet Place 1 tablet (0.4 mg) under the tongue every 5 minutes if needed for chest pain.    ondansetron ODT (ZOFRAN-ODT) 4 mg, Every 8 hours PRN    oxyCODONE-acetaminophen (Percocet) 7.5-325 mg tablet 1 tablet, oral, Every 6 hours PRN    Repatha SureClick 140 mg/mL injection Inject 1 mL (140 mg) under the skin every 14 (fourteen) days.    semaglutide 0.25 mg, Weekly    tadalafil (Cialis) 20 mg tablet 0.5 tablets, As needed    testosterone cypionate (Depo-Testosterone) 200 mg/mL injection Inject 0.5 mL (100 mg) into the muscle 1 (one) time per week.    Trelegy Ellipta 100-62.5-25 mcg blister with device Inhale 1 puff once daily. Use as directed         Vitals:  There were no vitals filed for this visit.    Physical Exam:  Physical Exam       Reviewed Study(s):    Cardiac Studies  Echo: No results found for this or any previous visit.  Angiogram: outside records reviewed: [facility] 4/4/25  80% left ICA stenosis just distal to the bifurcation from soft plaque.     Atherosclerotic changes of the right carotid bifurcation and the carotid   siphons intracranially without high-grade stenosis.  No significant right   carotid bifurcation narrowing (0%).     Hypoplastic left A1, common variant.  Otherwise, patent intracranial   circulation.     Arterial blood flow was measured to detect acute large vessel occlusion   by computer aided detection software: Not Performed.   Concordance between software and imaging review: Not Applicable.     Vascular Studies   Electrocardiogram, 12-lead PRN ACS symptoms  Sinus rhythm with occasional Premature ventricular complexes  Left anterior fascicular block  Minimal voltage criteria for LVH, may be normal variant  Cannot rule out  Inferior infarct (masked by fascicular block?) , age undetermined  Cannot rule out Anterior infarct , age undetermined  Abnormal ECG  When compared with ECG of 23-FEB-2000 08:04,  PREVIOUS ECG IS PRESENT  Confirmed by Addi Caballero ([id number]) on 5/12/2025 4:19:34 PM       Assessment/Plan     Reji Simons is a 70 y.o. year old male patient with h/o CAD and MI x2 PCI x3 last 7/2023, OA left hip, degenerative disc disease with chronic back pain, multiple lumbar surgery with hardware, chronically take prednisone daily for history of pseudogout, current daily smoker (2PPD >30 years), CALLIE L >R and ED. He reported h/o of many years of ED that is refractory to PDE5 inhibitors. Pt was seen by urology Dr. Ordonez 1/6/25 and had Penile duplex doppler US which showed right mix severe arterial and venous disease and left with severe arterial insufficiency w/o venous leakage. Pt was referred to us for possible consideration of angioplasty.     On last office visit on 1/7/25, US results reviewed with the patient and recommendations were smoking cessation and agressive risk factor modification before considering possible angioplasty of the pudendal artery. Pt educated changes in lifestyle can significantly improve his sexual and overall health. Once completed all the recommendations patient to return for re-evaluation.     Notes he Dr. Chun/Dr. Raya at [facility] indicated he needed carotid L surgery due to 80% occlusion, has yet to be schedule for stenting. Waiting to see if he is approved for clinical study. Notes he needs to have his L hip replaced but is delayed until he has his carotid fixed. Has reduced his smoking to <1ppd x 2 months. Reports he will quit smoking when he is given a surgery date for his carotid.  Notes previous attempts have been difficult and notes wellbutrin caused vomiting with his last cessation attempt. Former hx of cocaine abuse, notes that was easier to quit than tobacco. Denies any improvement in his ED  symptoms. Notes trimex is only partially effective but notes he has not had many chances to assess his current symptoms.     He is now s/p successful revascularization and stent placement of LEFT ICA (previously 80% occlusion) with Dr. Browne on 4/30      Problem List Items Addressed This Visit    None  Visit Diagnoses         Codes      Carotid artery stenosis, asymptomatic, left    -  Primary I65.22      Internal carotid artery stent present     Z95.828      CAD (coronary artery disease)     I25.10      Left hip pain     M25.552      Current smoker     F17.200      Erectile dysfunction     N52.9      Long-term use of aspirin therapy     Z79.82      At high risk for bleeding     Z91.89          I personally reviewed all above lab work, imaging results, and pertinent medical history prior to and/or during this visit.

## 2025-06-03 ENCOUNTER — HOSPITAL ENCOUNTER (OUTPATIENT)
Dept: VASCULAR MEDICINE | Facility: HOSPITAL | Age: 70
Discharge: HOME | End: 2025-06-03
Payer: MEDICARE

## 2025-06-03 ENCOUNTER — APPOINTMENT (OUTPATIENT)
Dept: CARDIOLOGY | Facility: CLINIC | Age: 70
End: 2025-06-03
Payer: MEDICARE

## 2025-06-03 VITALS
WEIGHT: 197 LBS | SYSTOLIC BLOOD PRESSURE: 136 MMHG | HEIGHT: 68 IN | DIASTOLIC BLOOD PRESSURE: 92 MMHG | BODY MASS INDEX: 29.86 KG/M2

## 2025-06-03 DIAGNOSIS — F17.200 CURRENT SMOKER: ICD-10-CM

## 2025-06-03 DIAGNOSIS — Z79.82 LONG-TERM USE OF ASPIRIN THERAPY: ICD-10-CM

## 2025-06-03 DIAGNOSIS — I65.29 CAROTID ARTERY STENOSIS: ICD-10-CM

## 2025-06-03 DIAGNOSIS — M25.552 LEFT HIP PAIN: ICD-10-CM

## 2025-06-03 DIAGNOSIS — I79.8 OTHER DISORDERS OF ARTERIES, ARTERIOLES AND CAPILLARIES IN DISEASES CLASSIFIED ELSEWHERE: ICD-10-CM

## 2025-06-03 DIAGNOSIS — N52.9 ERECTILE DYSFUNCTION: ICD-10-CM

## 2025-06-03 DIAGNOSIS — Z95.828 PRESENCE OF INTERNAL CAROTID STENT: ICD-10-CM

## 2025-06-03 DIAGNOSIS — Z95.828 INTERNAL CAROTID ARTERY STENT PRESENT: ICD-10-CM

## 2025-06-03 DIAGNOSIS — I65.22 CAROTID ARTERY STENOSIS, ASYMPTOMATIC, LEFT: Primary | ICD-10-CM

## 2025-06-03 DIAGNOSIS — Z91.89 AT HIGH RISK FOR BLEEDING: ICD-10-CM

## 2025-06-03 DIAGNOSIS — I65.23 OCCLUSION AND STENOSIS OF BILATERAL CAROTID ARTERIES: ICD-10-CM

## 2025-06-03 DIAGNOSIS — I25.10 CAD (CORONARY ARTERY DISEASE): ICD-10-CM

## 2025-06-03 PROCEDURE — 3008F BODY MASS INDEX DOCD: CPT | Performed by: INTERNAL MEDICINE

## 2025-06-03 PROCEDURE — 1159F MED LIST DOCD IN RCRD: CPT | Performed by: INTERNAL MEDICINE

## 2025-06-03 PROCEDURE — 99024 POSTOP FOLLOW-UP VISIT: CPT | Performed by: INTERNAL MEDICINE

## 2025-06-03 PROCEDURE — 1125F AMNT PAIN NOTED PAIN PRSNT: CPT | Performed by: INTERNAL MEDICINE

## 2025-06-03 PROCEDURE — 3080F DIAST BP >= 90 MM HG: CPT | Performed by: INTERNAL MEDICINE

## 2025-06-03 PROCEDURE — 3075F SYST BP GE 130 - 139MM HG: CPT | Performed by: INTERNAL MEDICINE

## 2025-06-03 PROCEDURE — 93880 EXTRACRANIAL BILAT STUDY: CPT | Performed by: SURGERY

## 2025-06-03 PROCEDURE — 93880 EXTRACRANIAL BILAT STUDY: CPT

## 2025-06-03 ASSESSMENT — PAIN SCALES - GENERAL: PAINLEVEL_OUTOF10: 6

## 2025-06-03 NOTE — PATIENT INSTRUCTIONS
Dr. Simons,     It was pleasure meeting you. Please make sure to have a follow up scheduled for a next year with repeat carotid ultrasound imaging. If you have any dizziness, vertigo or change in your vision or weakness please call use for sooner appointment,     Sincerely,    cardiology team

## 2025-06-24 DIAGNOSIS — M96.1 POSTLAMINECTOMY SYNDROME, LUMBAR REGION: ICD-10-CM

## 2025-06-24 RX ORDER — OXYCODONE AND ACETAMINOPHEN 7.5; 325 MG/1; MG/1
1 TABLET ORAL EVERY 6 HOURS PRN
Qty: 120 TABLET | Refills: 0 | Status: SHIPPED | OUTPATIENT
Start: 2025-06-24

## 2025-06-24 NOTE — TELEPHONE ENCOUNTER
Request refill of Percocet. No upcoming appointment. Pt stated that he had hip replacement done last week and asked if can refill his pain medication.

## 2025-07-14 ENCOUNTER — PREP FOR PROCEDURE (OUTPATIENT)
Facility: CLINIC | Age: 70
End: 2025-07-14
Payer: MEDICARE

## 2025-07-14 ENCOUNTER — TELEPHONE (OUTPATIENT)
Dept: PAIN MEDICINE | Facility: CLINIC | Age: 70
End: 2025-07-14
Payer: MEDICARE

## 2025-07-14 DIAGNOSIS — M54.16 LUMBAR RADICULOPATHY: Primary | ICD-10-CM

## 2025-07-14 NOTE — TELEPHONE ENCOUNTER
Patient called in inquiring about IT trial. Reinforced education about the trial. Sent [employer] message for information on how to complete the psych eval.     Patient wants to know if he could have an epidural in the mean time?

## 2025-07-14 NOTE — LETTER
7/14/25   Reji Roppolo   1955   [address] [place] [address]    Dear Dr. Browne,    ______ Patient IS CLEARED to hold PLAVIX (CLOPIDROGEL) for 7 days for Interventional Pain Management Procedure.  ______ Patient IS NOT CLEARED to hold PLAVIX (CLOPIDROGEL) for Interventional Pain Management Procedure.    Comments: ______________________________________________________________________________________________________________________    Physician Print Name: ______________________________________    Physician Signature: ________________________________________    Date/Time: _______________________________________________    We thank you for your prompt response.  Please fax response back to [phone].    Sincerely,      DR. SWETA THOMAS

## 2025-07-22 ENCOUNTER — HOSPITAL ENCOUNTER (OUTPATIENT)
Dept: GASTROENTEROLOGY | Facility: HOSPITAL | Age: 70
Discharge: HOME | End: 2025-07-22
Payer: MEDICARE

## 2025-07-22 VITALS
SYSTOLIC BLOOD PRESSURE: 112 MMHG | OXYGEN SATURATION: 97 % | HEIGHT: 68 IN | WEIGHT: 192 LBS | HEART RATE: 88 BPM | BODY MASS INDEX: 29.1 KG/M2 | RESPIRATION RATE: 18 BRPM | DIASTOLIC BLOOD PRESSURE: 64 MMHG | TEMPERATURE: 97.3 F

## 2025-07-22 DIAGNOSIS — M54.16 LUMBAR RADICULOPATHY: ICD-10-CM

## 2025-07-22 PROCEDURE — 7100000010 HC PHASE TWO TIME - EACH INCREMENTAL 1 MINUTE

## 2025-07-22 PROCEDURE — 2550000001 HC RX 255 CONTRASTS: Mod: JW | Performed by: ANESTHESIOLOGY

## 2025-07-22 PROCEDURE — 2500000004 HC RX 250 GENERAL PHARMACY W/ HCPCS (ALT 636 FOR OP/ED): Performed by: ANESTHESIOLOGY

## 2025-07-22 PROCEDURE — 7100000009 HC PHASE TWO TIME - INITIAL BASE CHARGE

## 2025-07-22 PROCEDURE — 62321 NJX INTERLAMINAR CRV/THRC: CPT | Performed by: ANESTHESIOLOGY

## 2025-07-22 RX ORDER — TRIAMCINOLONE ACETONIDE 40 MG/ML
INJECTION, SUSPENSION INTRA-ARTICULAR; INTRAMUSCULAR AS NEEDED
Status: COMPLETED | OUTPATIENT
Start: 2025-07-22 | End: 2025-07-22

## 2025-07-22 RX ORDER — LIDOCAINE HYDROCHLORIDE 5 MG/ML
INJECTION, SOLUTION INFILTRATION; INTRAVENOUS AS NEEDED
Status: COMPLETED | OUTPATIENT
Start: 2025-07-22 | End: 2025-07-22

## 2025-07-22 RX ADMIN — IOHEXOL 1 ML: 240 INJECTION, SOLUTION INTRATHECAL; INTRAVASCULAR; INTRAVENOUS; ORAL at 08:22

## 2025-07-22 RX ADMIN — TRIAMCINOLONE ACETONIDE 60 MG: 40 INJECTION, SUSPENSION INTRA-ARTICULAR; INTRAMUSCULAR at 08:22

## 2025-07-22 RX ADMIN — LIDOCAINE HYDROCHLORIDE 10 ML: 5 INJECTION, SOLUTION INFILTRATION at 08:22

## 2025-07-22 ASSESSMENT — PAIN - FUNCTIONAL ASSESSMENT
PAIN_FUNCTIONAL_ASSESSMENT: 0-10
PAIN_FUNCTIONAL_ASSESSMENT: 0-10

## 2025-07-22 ASSESSMENT — PAIN SCALES - GENERAL
PAINLEVEL_OUTOF10: 7
PAINLEVEL_OUTOF10: 2

## 2025-07-22 NOTE — Clinical Note
Patient scheduled for caudal epidural. After discussion with Dr. Damon patient requesting to have a thoracic epidural instead due to pain location. Consent done for T9-T10 epidural with patient.

## 2025-07-22 NOTE — DISCHARGE INSTRUCTIONS
You underwent a procedure today    After most procedures, it is recommended that you relax and limit your activity for the remainder of the day unless you have been told otherwise by your pain physician.  You should not drive a car, operate machinery, or make important legal decisions unless otherwise directed by your pain physician.  You may resume your normal activity, including exercise, tomorrow.      Keep a written pain diary of how much pain relief you experienced following the procedure and the length of time of pain relief you experienced pain relief. Following diagnostic injections like medial branch nerve blocks, sacroiliac joint blocks, stellate ganglion injections and other blocks, it is very important you record the specific amount of pain relief you experienced immediately after the injectionand how long it lasted. Your doctor will ask you for this information at your follow up visit.     For all injections, please keep the injection site dry and inspect the site for a couple of days. You may remove the Band-Aid the day of the injection at any time.     Some discomfort, bruising or slight swelling may occur at the injection site. This is not abnormal if it occurs.  If needed you may:    -Take over the counter medication such as Tylenol or Motrin.   -Apply an ice pack for 30 minutes, 2 to 3 times a day for the first 24 hours.     You may shower today; no soaking baths, hot tubs, whirlpools or swimming pools for two days.      If you are given steroids in your injection, it may take 3-5 days for the steroid medication to take effect. You may notice a worsening of your symptoms for 1-2 days after the injection. This is not abnormal.  You may use acetaminophen, ibuprofen, or prescription medication that your doctor may have prescribed for you if you need to do so.     A few common side effects of steroids include facial flushing, sweating, restlessness, irritability,difficulty sleeping, increase in blood  sugar, and increased blood pressure. If you have diabetes, please monitor your blood sugar at least once a day for at least 5 days. If you have poorly controlled high blood pressure, monitoryour blood pressure for at least 2 days and contact your primary care physician if these numbers are unusually high for you.      If you take aspirin or non-steroidal anti-inflammatory drugs (examples are Motrin, Advil, ibuprofen, Naprosyn, Voltaren, Relafen, etc.) you may restart these this evening.    You do not need to discontinue non-aspirin-containing pain medications prior to an injection (examples: Celebrex, tramadol, hydrocodone and acetaminophen).      If you take a blood thinning medication (Coumadin, Lovenox, Fragmin,Ticlid, Plavix, Pradaxa, etc.), please discuss this with your primary care physician/cardiologist and your pain physician. These medications MUST be discontinued before you can have an injection safely, without the risk of uncontrolled bleeding. If these medications are not discontinued for an appropriate period of time, you will not be able to receivean injection.      If you are taking Coumadin, please have your INR checked the morning of your procedure and bringthe result to your appointment unless otherwise instructed. If your INR is over 1.2, your injection may need to be rescheduled to avoid uncontrolled bleeding from the needle placement.     Call [facility] at [phone] between 8am-4pm Monday - Friday if you are experiencing the following:    If you received an epidural or spinal injection:    -Headache that doesnot go away with medicine, is worse when sitting or standing up, and is greatly relieved upon lying down.   -Severe pain worse than or different than your baseline pain.   -Chills or fever (101º F or greater).   -Drainage or signs of infection at the injection site     Go directly to the Emergency Department if you are experiencing the following and received an  epidural or spinal injection:   -Abrupt weakness or progressive weakness in your legs that starts after you leave the clinic.   -Abrupt severe or worsening numbness in your legs.   -Inability to urinate after the injection or loss of bowel or bladder control without the urge to defecate or urinate.     If you have a clinical question that cannot wait until your next appointment, please call [phone] between 8am-4pm Monday - Friday or send a [employer] message. We do our best to return all non-emergency messages within 24 hours, Monday - Friday. A nurse or physician will return your message.      If you need to cancel an appointment, please call the scheduling staff at [phone] during normal business hours or leave a message at least 24 hours in advance.     If you are going to be sedated for your next procedure, you MUST have responsible adult who can legally drive accompany you home. You cannot eat or drink for eight hours prior to the planned procedure if you are going to receive sedation. You may take your non-blood thinning medications with a small sip of water.

## 2025-07-22 NOTE — H&P
History Of Present Illness  Reji Hanks is a 70 y.o. male presenting with low back and bilateral leg pain.     Past Medical History  Medical History[1]    Surgical History  Surgical History[2]     Social History  He reports that he has been smoking cigarettes. He started smoking about 32 years ago. He has a 65.1 pack-year smoking history. He has never used smokeless tobacco. He reports current alcohol use of about 4.0 standard drinks of alcohol per week. He reports current drug use. Drug: Oxycodone.    Family History  Family History[3]     Allergies  Titanium, Varenicline, and Wellbutrin [bupropion hcl]    Review of Systems     Physical Exam     Last Recorded Vitals  There were no vitals taken for this visit.    Relevant Results             Assessment & Plan  Lumbar radiculopathy      Caudal epidural steroid injection    I spent 10 minutes in the professional and overall care of this patient.      Mika Thomas MD         [1]   Past Medical History:  Diagnosis Date    Depression 1/2015    Fractures 06/22    Lumbar pain    [2]   Past Surgical History:  Procedure Laterality Date    BACK SURGERY  3/2034    EPIDURAL STEROID INJECTION  10/31/2024    CAUDAL DR MIKA THOMAS    INVASIVE VASCULAR PROCEDURE N/A 4/30/2025    Procedure: Carotid Angiogram;  Surgeon: Ruddy Browne DO;  Location: [place] [address] Cardiac Cath Lab;  Service: Cardiovascular;  Laterality: N/A;    SPINAL FUSION  03/23   [3]   Family History  Problem Relation Name Age of Onset    Diabetes Mother      Stroke Mother      Prostate cancer Father      Cancer Father Usman hanks     Coronary artery disease Father Usman hanks

## 2025-07-22 NOTE — POST-PROCEDURE NOTE
Pt arrived to recovery pt awake alert  Resp even unlab   Drsng to lower back is dry and intact   Discharge instruction reviewed pt verbalize understanding

## 2025-07-25 ENCOUNTER — PREP FOR PROCEDURE (OUTPATIENT)
Facility: CLINIC | Age: 70
End: 2025-07-25
Payer: MEDICARE

## 2025-07-25 DIAGNOSIS — M96.1 POSTLAMINECTOMY SYNDROME, LUMBAR REGION: Primary | ICD-10-CM

## 2025-07-25 DIAGNOSIS — Z79.891 ENCOUNTER FOR LONG-TERM OPIATE ANALGESIC USE: Primary | ICD-10-CM

## 2025-07-25 RX ORDER — NALOXONE HYDROCHLORIDE 4 MG/.1ML
1 SPRAY NASAL AS NEEDED
Qty: 2 EACH | Refills: 0 | Status: SHIPPED | OUTPATIENT
Start: 2025-07-25

## 2025-08-01 DIAGNOSIS — N52.01 ERECTILE DYSFUNCTION DUE TO ARTERIAL INSUFFICIENCY: Primary | ICD-10-CM

## 2025-08-12 ENCOUNTER — HOSPITAL ENCOUNTER (OUTPATIENT)
Dept: GASTROENTEROLOGY | Facility: HOSPITAL | Age: 70
Discharge: HOME | End: 2025-08-12
Payer: MEDICARE

## 2025-08-12 VITALS
OXYGEN SATURATION: 99 % | HEART RATE: 77 BPM | BODY MASS INDEX: 29.1 KG/M2 | SYSTOLIC BLOOD PRESSURE: 119 MMHG | RESPIRATION RATE: 17 BRPM | DIASTOLIC BLOOD PRESSURE: 67 MMHG | TEMPERATURE: 97.9 F | HEIGHT: 68 IN | WEIGHT: 192 LBS

## 2025-08-12 DIAGNOSIS — M96.1 POSTLAMINECTOMY SYNDROME, LUMBAR REGION: ICD-10-CM

## 2025-08-12 PROCEDURE — 7100000009 HC PHASE TWO TIME - INITIAL BASE CHARGE

## 2025-08-12 PROCEDURE — 2500000004 HC RX 250 GENERAL PHARMACY W/ HCPCS (ALT 636 FOR OP/ED): Performed by: ANESTHESIOLOGY

## 2025-08-12 PROCEDURE — 62323 NJX INTERLAMINAR LMBR/SAC: CPT | Performed by: ANESTHESIOLOGY

## 2025-08-12 PROCEDURE — 7100000010 HC PHASE TWO TIME - EACH INCREMENTAL 1 MINUTE

## 2025-08-12 RX ORDER — OXYCODONE AND ACETAMINOPHEN 7.5; 325 MG/1; MG/1
1 TABLET ORAL EVERY 6 HOURS PRN
Qty: 120 TABLET | Refills: 0 | Status: SHIPPED | OUTPATIENT
Start: 2025-08-12

## 2025-08-12 RX ORDER — LIDOCAINE HYDROCHLORIDE 5 MG/ML
INJECTION, SOLUTION INFILTRATION; INTRAVENOUS AS NEEDED
Status: COMPLETED | OUTPATIENT
Start: 2025-08-12 | End: 2025-08-12

## 2025-08-12 RX ADMIN — LIDOCAINE HYDROCHLORIDE 8 ML: 5 INJECTION, SOLUTION INFILTRATION at 07:54

## 2025-08-12 ASSESSMENT — PAIN DESCRIPTION - DESCRIPTORS
DESCRIPTORS: ACHING
DESCRIPTORS: ACHING;SHARP
DESCRIPTORS: ACHING
DESCRIPTORS: ACHING;SHARP
DESCRIPTORS: ACHING;SHARP
DESCRIPTORS: ACHING
DESCRIPTORS: ACHING

## 2025-08-12 ASSESSMENT — PAIN - FUNCTIONAL ASSESSMENT
PAIN_FUNCTIONAL_ASSESSMENT: 0-10

## 2025-08-12 ASSESSMENT — COLUMBIA-SUICIDE SEVERITY RATING SCALE - C-SSRS
2. HAVE YOU ACTUALLY HAD ANY THOUGHTS OF KILLING YOURSELF?: NO
1. IN THE PAST MONTH, HAVE YOU WISHED YOU WERE DEAD OR WISHED YOU COULD GO TO SLEEP AND NOT WAKE UP?: NO
6. HAVE YOU EVER DONE ANYTHING, STARTED TO DO ANYTHING, OR PREPARED TO DO ANYTHING TO END YOUR LIFE?: NO

## 2025-08-12 ASSESSMENT — PAIN SCALES - GENERAL
PAINLEVEL_OUTOF10: 7
PAINLEVEL_OUTOF10: 3
PAINLEVEL_OUTOF10: 3
PAINLEVEL_OUTOF10: 0 - NO PAIN
PAINLEVEL_OUTOF10: 7
PAINLEVEL_OUTOF10: 2
PAINLEVEL_OUTOF10: 3
PAINLEVEL_OUTOF10: 7

## 2025-08-13 ENCOUNTER — APPOINTMENT (OUTPATIENT)
Dept: PAIN MEDICINE | Facility: CLINIC | Age: 70
End: 2025-08-13
Payer: MEDICARE

## 2025-08-19 ENCOUNTER — OFFICE VISIT (OUTPATIENT)
Dept: CARDIOLOGY | Facility: CLINIC | Age: 70
End: 2025-08-19
Payer: MEDICARE

## 2025-08-19 VITALS
HEIGHT: 68 IN | HEART RATE: 100 BPM | DIASTOLIC BLOOD PRESSURE: 62 MMHG | BODY MASS INDEX: 31.31 KG/M2 | WEIGHT: 206.6 LBS | SYSTOLIC BLOOD PRESSURE: 108 MMHG | OXYGEN SATURATION: 94 %

## 2025-08-19 DIAGNOSIS — N52.1 ERECTILE DYSFUNCTION DUE TO DISEASES CLASSIFIED ELSEWHERE: Primary | ICD-10-CM

## 2025-08-19 DIAGNOSIS — Z95.828 HISTORY OF LEFT COMMON CAROTID ARTERY STENT PLACEMENT: ICD-10-CM

## 2025-08-19 DIAGNOSIS — E78.2 MIXED HYPERLIPIDEMIA: ICD-10-CM

## 2025-08-19 DIAGNOSIS — Z98.890 HISTORY OF LEFT COMMON CAROTID ARTERY STENT PLACEMENT: ICD-10-CM

## 2025-08-19 PROBLEM — Z96.649 HISTORY OF TOTAL HIP REPLACEMENT: Status: ACTIVE | Noted: 2025-07-01

## 2025-08-19 PROBLEM — M19.039 ARTHRITIS OF SCAPHO-TRAPEZIUM-TRAPEZOID JOINT: Status: RESOLVED | Noted: 2018-07-27 | Resolved: 2025-08-19

## 2025-08-19 PROBLEM — M54.50 LOW BACK PAIN, UNSPECIFIED: Status: RESOLVED | Noted: 2023-08-01 | Resolved: 2025-08-19

## 2025-08-19 PROBLEM — M25.632 DECREASED RANGE OF MOTION OF LEFT WRIST: Status: RESOLVED | Noted: 2020-11-23 | Resolved: 2025-08-19

## 2025-08-19 PROBLEM — I10 ESSENTIAL (PRIMARY) HYPERTENSION: Status: RESOLVED | Noted: 2022-07-19 | Resolved: 2025-08-19

## 2025-08-19 PROBLEM — G45.9 TIA (TRANSIENT ISCHEMIC ATTACK): Status: RESOLVED | Noted: 2018-06-03 | Resolved: 2025-08-19

## 2025-08-19 PROBLEM — I25.10 CAD IN NATIVE ARTERY: Status: RESOLVED | Noted: 2021-12-06 | Resolved: 2025-08-19

## 2025-08-19 PROBLEM — M54.2 CHRONIC NECK PAIN: Status: RESOLVED | Noted: 2018-10-02 | Resolved: 2025-08-19

## 2025-08-19 PROBLEM — G47.33 OBSTRUCTIVE SLEEP APNEA SYNDROME: Status: RESOLVED | Noted: 2023-09-22 | Resolved: 2025-08-19

## 2025-08-19 PROBLEM — F41.8 OTHER SPECIFIED ANXIETY DISORDERS: Status: RESOLVED | Noted: 2018-08-27 | Resolved: 2025-08-19

## 2025-08-19 PROBLEM — Z86.73 HISTORY OF TIA (TRANSIENT ISCHEMIC ATTACK): Status: RESOLVED | Noted: 2024-10-10 | Resolved: 2025-08-19

## 2025-08-19 PROBLEM — J43.9 EMPHYSEMA, UNSPECIFIED: Status: RESOLVED | Noted: 2024-12-20 | Resolved: 2025-08-19

## 2025-08-19 PROBLEM — M16.12 OSTEOARTHRITIS OF LEFT HIP: Status: ACTIVE | Noted: 2025-03-31

## 2025-08-19 PROBLEM — G89.29 CHRONIC NECK PAIN: Status: RESOLVED | Noted: 2018-10-02 | Resolved: 2025-08-19

## 2025-08-19 PROBLEM — M54.40 LUMBAGO WITH SCIATICA, UNSPECIFIED SIDE: Status: RESOLVED | Noted: 2018-10-02 | Resolved: 2025-08-19

## 2025-08-19 PROCEDURE — G2211 COMPLEX E/M VISIT ADD ON: HCPCS | Performed by: INTERNAL MEDICINE

## 2025-08-19 PROCEDURE — 1159F MED LIST DOCD IN RCRD: CPT | Performed by: INTERNAL MEDICINE

## 2025-08-19 PROCEDURE — 3008F BODY MASS INDEX DOCD: CPT | Performed by: INTERNAL MEDICINE

## 2025-08-19 PROCEDURE — 99202 OFFICE O/P NEW SF 15 MIN: CPT

## 2025-08-19 PROCEDURE — 99204 OFFICE O/P NEW MOD 45 MIN: CPT | Performed by: INTERNAL MEDICINE

## 2025-08-19 RX ORDER — DOCUSATE SODIUM 100 MG/1
100 CAPSULE, LIQUID FILLED ORAL 2 TIMES DAILY
COMMUNITY
End: 2025-08-21 | Stop reason: SDUPTHER

## 2025-08-20 ENCOUNTER — TELEMEDICINE (OUTPATIENT)
Dept: PAIN MEDICINE | Facility: CLINIC | Age: 70
End: 2025-08-20
Payer: MEDICARE

## 2025-08-20 ENCOUNTER — PREP FOR PROCEDURE (OUTPATIENT)
Dept: PAIN MEDICINE | Facility: CLINIC | Age: 70
End: 2025-08-20

## 2025-08-20 DIAGNOSIS — M96.1 POSTLAMINECTOMY SYNDROME, LUMBAR REGION: Primary | ICD-10-CM

## 2025-08-20 PROBLEM — N52.1 ERECTILE DYSFUNCTION DUE TO DISEASES CLASSIFIED ELSEWHERE: Status: ACTIVE | Noted: 2025-08-19

## 2025-08-20 PROCEDURE — G2211 COMPLEX E/M VISIT ADD ON: HCPCS | Performed by: ANESTHESIOLOGY

## 2025-08-20 PROCEDURE — 1125F AMNT PAIN NOTED PAIN PRSNT: CPT | Performed by: ANESTHESIOLOGY

## 2025-08-20 PROCEDURE — 99214 OFFICE O/P EST MOD 30 MIN: CPT | Performed by: ANESTHESIOLOGY

## 2025-08-20 PROCEDURE — 1159F MED LIST DOCD IN RCRD: CPT | Performed by: ANESTHESIOLOGY

## 2025-08-20 ASSESSMENT — PAIN - FUNCTIONAL ASSESSMENT: PAIN_FUNCTIONAL_ASSESSMENT: 0-10

## 2025-08-20 ASSESSMENT — PATIENT HEALTH QUESTIONNAIRE - PHQ9
SUM OF ALL RESPONSES TO PHQ9 QUESTIONS 1 & 2: 2
2. FEELING DOWN, DEPRESSED OR HOPELESS: SEVERAL DAYS
1. LITTLE INTEREST OR PLEASURE IN DOING THINGS: SEVERAL DAYS

## 2025-08-20 ASSESSMENT — ENCOUNTER SYMPTOMS
BACK PAIN: 1
ACTIVITY CHANGE: 1

## 2025-08-20 ASSESSMENT — PAIN SCALES - GENERAL
PAINLEVEL_OUTOF10: 8
PAINLEVEL_OUTOF10: 8

## 2025-08-21 DIAGNOSIS — T40.2X5A OPIOID-INDUCED CONSTIPATION: ICD-10-CM

## 2025-08-21 DIAGNOSIS — K59.03 OPIOID-INDUCED CONSTIPATION: ICD-10-CM

## 2025-08-21 RX ORDER — DOCUSATE SODIUM 100 MG/1
100 CAPSULE, LIQUID FILLED ORAL 2 TIMES DAILY
Qty: 60 CAPSULE | Refills: 1 | Status: SHIPPED | OUTPATIENT
Start: 2025-08-21

## 2025-09-02 ENCOUNTER — TELEPHONE (OUTPATIENT)
Dept: CARDIOLOGY | Facility: CLINIC | Age: 70
End: 2025-09-02

## 2025-09-02 DIAGNOSIS — E78.2 MIXED HYPERLIPIDEMIA: Primary | ICD-10-CM

## 2025-09-02 LAB
ANION GAP SERPL CALC-SCNC: 11 MMOL/L (ref 10–20)
BUN SERPL-MCNC: 19 MG/DL (ref 6–23)
CALCIUM SERPL-MCNC: 9.1 MG/DL (ref 8.6–10.3)
CHLORIDE SERPL-SCNC: 104 MMOL/L (ref 98–107)
CHOLEST SERPL-MCNC: 87 MG/DL (ref 0–199)
CHOLESTEROL/HDL RATIO: 2.7
CO2 SERPL-SCNC: 26 MMOL/L (ref 21–32)
CREAT SERPL-MCNC: 1.52 MG/DL (ref 0.5–1.3)
EGFRCR SERPLBLD CKD-EPI 2021: 50 ML/MIN/1.73M*2
ERYTHROCYTE [DISTWIDTH] IN BLOOD BY AUTOMATED COUNT: 18.5 % (ref 11.5–14.5)
GLUCOSE SERPL-MCNC: 86 MG/DL (ref 74–99)
HCT VFR BLD AUTO: 46.2 % (ref 41–52)
HDLC SERPL-MCNC: 32.3 MG/DL
HGB BLD-MCNC: 14.1 G/DL (ref 13.5–17.5)
LDLC SERPL CALC-MCNC: 36 MG/DL
MCH RBC QN AUTO: 25.3 PG (ref 26–34)
MCHC RBC AUTO-ENTMCNC: 30.5 G/DL (ref 32–36)
MCV RBC AUTO: 83 FL (ref 80–100)
NON HDL CHOLESTEROL: 55 MG/DL (ref 0–149)
NRBC BLD-RTO: 0 /100 WBCS (ref 0–0)
PLATELET # BLD AUTO: 259 X10*3/UL (ref 150–450)
POTASSIUM SERPL-SCNC: 4.3 MMOL/L (ref 3.5–5.3)
RBC # BLD AUTO: 5.57 X10*6/UL (ref 4.5–5.9)
SODIUM SERPL-SCNC: 137 MMOL/L (ref 136–145)
TRIGL SERPL-MCNC: 94 MG/DL (ref 0–149)
VLDL: 19 MG/DL (ref 0–40)
WBC # BLD AUTO: 7.2 X10*3/UL (ref 4.4–11.3)

## 2025-09-02 PROCEDURE — 80048 BASIC METABOLIC PNL TOTAL CA: CPT

## 2025-09-02 PROCEDURE — 80061 LIPID PANEL: CPT

## 2025-09-02 PROCEDURE — 85027 COMPLETE CBC AUTOMATED: CPT

## 2025-09-12 ENCOUNTER — APPOINTMENT (OUTPATIENT)
Facility: CLINIC | Age: 70
End: 2025-09-12
Payer: MEDICARE

## (undated) DEVICE — CATHETER, ANGIO, IMPULSE, FR4, 5 FR X 125 CM

## (undated) DEVICE — CATHETER, BALLOON DILATION, EUPHORA SEMICOMPLIANT 4.00  X 20 MM X 142CM

## (undated) DEVICE — GUIDEWIRE, HI-TORQUE, VERSACORE, 260CM, FLOPPY

## (undated) DEVICE — CATHETER, ANGIO, IMPULSE, PIG 155 DEG, 5 FR X 110 CM

## (undated) DEVICE — SHEATH, PINNACLE, 10 CM,  5FR INTRODUCER, 5FR DIA, 2.5 CM DIALATOR

## (undated) DEVICE — ACCESS KIT, S-MAK MINI, 4FR 10CM 0.018IN 40CM, NT/PT, ECHO ENHANCE NEEDLE

## (undated) DEVICE — CATHETER, 5MMX20MMX135CM 14 PLUS BALLOON

## (undated) DEVICE — INFLATION KIT, ADVANTAGE, ENCORE 26 (1/BOX)

## (undated) DEVICE — GUIDEWIRE, STIFF SHAFT, ANGLE TIP, .035 DIA, 260 CM,  3 CM TIP"

## (undated) DEVICE — ELECTRODE, QUICK-COMBO, REDI PACK

## (undated) DEVICE — CLOSURE DEVICE, VASCULAR, ANGIO-SEAL, VIP, 6FR, LF

## (undated) DEVICE — PROTECTION SYSTEM, EMBOSHIELD NAV6, LARGE VESSEL 7.2MM

## (undated) DEVICE — INTRODUCER SET, SHUTTLE FLEXOR, 6FR/.038/90CM/RB/SHTL/HC